# Patient Record
Sex: FEMALE | Race: WHITE | NOT HISPANIC OR LATINO | Employment: UNEMPLOYED | URBAN - METROPOLITAN AREA
[De-identification: names, ages, dates, MRNs, and addresses within clinical notes are randomized per-mention and may not be internally consistent; named-entity substitution may affect disease eponyms.]

---

## 2021-03-30 LAB
HEPATITIS B SURFACE ANTIGEN (EXTERNAL): NONREACTIVE
HIV1+2 AB SERPL QL IA: NONREACTIVE
RUBELLA ANTIBODY IGG (EXTERNAL): NORMAL

## 2021-04-28 ENCOUNTER — TRANSFERRED RECORDS (OUTPATIENT)
Dept: HEALTH INFORMATION MANAGEMENT | Facility: CLINIC | Age: 26
End: 2021-04-28

## 2021-04-28 LAB — PAP-ABSTRACT: NORMAL

## 2021-08-03 ENCOUNTER — TELEPHONE (OUTPATIENT)
Dept: EDUCATION SERVICES | Facility: CLINIC | Age: 26
End: 2021-08-03

## 2021-08-03 ENCOUNTER — TRANSFERRED RECORDS (OUTPATIENT)
Dept: HEALTH INFORMATION MANAGEMENT | Facility: CLINIC | Age: 26
End: 2021-08-03

## 2021-08-03 NOTE — TELEPHONE ENCOUNTER
records received  8/3/2021 11:22am inside DM consult fax     [facility] - [phone], Referring: Dr Yudi Lira  DX: GDM

## 2021-08-04 NOTE — TELEPHONE ENCOUNTER
Date: 8/5/2021 Status: Scheduled   Time: 3:00 PM Length: 60   Visit Type: VIDEO VISIT NEW [1702] Copay: $0.00   Provider: Christi Hernadez RD Department: [facility] DIABETES EDUCATION   Bill Area: Diabetic Education [facility]

## 2021-08-05 ENCOUNTER — PATIENT OUTREACH (OUTPATIENT)
Dept: EDUCATION SERVICES | Facility: CLINIC | Age: 26
End: 2021-08-05
Payer: COMMERCIAL

## 2021-08-05 DIAGNOSIS — O24.419 GESTATIONAL DIABETES: Primary | ICD-10-CM

## 2021-08-05 DIAGNOSIS — O24.410 DIET CONTROLLED GESTATIONAL DIABETES MELLITUS (GDM) IN THIRD TRIMESTER: ICD-10-CM

## 2021-08-05 PROBLEM — Z34.90 PREGNANT: Status: RESOLVED | Noted: 2021-03-30 | Resolved: 2021-08-05

## 2021-08-05 PROBLEM — Z34.90 PREGNANT: Status: ACTIVE | Noted: 2021-03-30

## 2021-08-05 PROCEDURE — G0108 DIAB MANAGE TRN  PER INDIV: HCPCS | Mod: 95 | Performed by: FAMILY MEDICINE

## 2021-08-05 RX ORDER — LANCETS
EACH MISCELLANEOUS
Qty: 100 EACH | Refills: 4 | Status: SHIPPED | OUTPATIENT
Start: 2021-08-05

## 2021-08-05 RX ORDER — BLOOD SUGAR DIAGNOSTIC
STRIP MISCELLANEOUS
Qty: 125 STRIP | Refills: 3 | Status: SHIPPED | OUTPATIENT
Start: 2021-08-05

## 2021-08-05 RX ORDER — BLOOD-GLUCOSE METER
1 EACH MISCELLANEOUS 4 TIMES DAILY
Qty: 1 KIT | Refills: 1 | Status: SHIPPED | OUTPATIENT
Start: 2021-08-05

## 2021-08-05 NOTE — PROGRESS NOTES
"Type of Service: Telephone Visit    How would patient like to obtain AVS? Mail a copy     Diabetes Self-Management Education & Support    ASSESSMENT:  Initial telephone visit for gestational diabetes education and counseling. Yue has counted carbohydrates in the past and caught on quickly. Both of her parents have type 2 diabetes and she has seen them check their blood sugar. Patient is active every day.     SUBJECTIVE/OBJECTIVE:  Accompanied by: Self  Diabetes management related comments/concerns: Newly diagnosed gestational diabetes  Gestational weeks: 27 weeks  [facility]  Next OB Visit Date: 08/12/21  Number of previous preganancies: 0  Had any babies over 9 lbs: No  Previously had Gestational Diabetes: No  Do you drink beer, wine or hard liquor?: No    Cultural Influences/Ethnic Background:  Not  or   Half , half white     Estimated Date of Delivery: 11/4/2021 27w0d    Glucose Screen: 161 (5/24/21)    3 Hour OGTT (8/3/21)  Fasting  100    1 hour  209    2 hour  Not taken    3 hour  Not taken    Lifestyle and Health Behaviors:  Height: 5'1\"   Stated weight: 246 lb  Pre-pregnancy weight (lbs): 241. Was nauseated in the beginning and lost 10 pounds. Slowly regaining some weight as nausea resolved.  Exercise: Yes  Days per week of moderate to strenuous exercise (like a brisk walk): 7  On average, minutes per day of exercise at this level: 60  How intense was your typical exercise? : Light (like stretching or slow walking) (Walks her dog 2-3 times per day for a total of 45-60 minutes/day)  Exercise Minutes per Week: 420  Barrier to exercise: None  Meal planning/habits: Carb counting, Avoiding sweets (Has used [employer] food tracker in the past)  Meals include: Breakfast, Lunch, Dinner, Morning Snack, Afternoon Snack, Evening Snack  Breakfast: Cold cereal or eggs or smoothie, milk. stopped drinking coffee after pregnant.  Lunch: [place] or leftovers  Dinner: Meat " (salmon fish, chicken or pork), green vegetable, starch (pasta or rice or quinoa or potato)  Snacks: Apple or carrots or nuts or chips or granola bar or ice cream  Beverages: Water, Diet soda, Juice (Unsweetened cold tea)  Experiencing nausea?: No  Experiencing heartburn?: No    Healthy Coping:  Emotional response to diabetes: Ready to learn    Current Management:  Taking medications for gestational diabetes? No    INTERVENTION:  Patient was instructed on basic steps for checking blood sugar. After she picks up the meter and supplies at [facility] she will watch a YouTube video on using meter.    Educational topics covered today:  GDM diagnosis, pathophysiology, Risks and Complications of GDM, Means of controlling GDM, Using a Blood Glucose Monitor, Blood Glucose Goals, Logging and Interpreting Glucose Results, Ketone Testing, When to Call a Diabetes Educator or OB Provider, Healthy Eating During Pregnancy, Counting Carbohydrates, Meal Planning for GDM, and Physical Activity    Educational materials sent via email with permission  [place] Understanding Gestational Diabetes  [place] GDM bedtime snack list    Pt verbalized understanding of concepts discussed and recommendations provided today.     PLAN:  Check glucose 4 times daily, before breakfast and 1 hour after each meal.     Check Ketones daily for one week, if negative, reduce testing to once a week.     Physical activity recommended: 10-15 minute walk after meals.    Meal plan: 30-45 gm carbs at breakfast, 45-60 gm carbs at lunch, 45-60 gm carbs at supper, 15-30 gm carbs at 3 snacks a day.  Follow consistent carbohydrate meal plan, eat carbohydrate and protein/fat at all meals/snacks.    Call diabetes care if 3 or more blood sugars are above the goal in 1 week, if ketones are positive, or with questions/concerns.    Diabetes Care: [phone]    Next appointment in person at the [facility] on Thursday, 8/12/21 at 3:30  PM.    Diabetes Self-Management Training ()  Time Spent: 60 minutes  Encounter Type: Individual      Christi Hernadez RD, CARMELLA, [facility]  Certified Diabetes Care and . Cell: [phone]    Any diabetes medication dose changes were made via the CDE Protocol and Collaborative Practice Agreement with the patient's OB/GYN provider. A copy of this encounter was shared with the provider.

## 2021-08-05 NOTE — PATIENT INSTRUCTIONS
1. Check urine for ketones in the morning. Your goal is negative or trace ketones. If you have ketones in your urine it means you are not eating enough before you go to bed. Eat a larger bedtime snack and include protein. Remember that ketones are not good for your baby. Drinking water during the day helps to dilute ketones.    2. Check blood sugar 4 times per day. Before breakfast and 1 hour after every meal.          Blood sugar goals:        Before breakfast: less 95        1 hour after meals: less 140        2 hours after meals: less 120    3. Eat 3 meals and 3 snacks per day according to the meal plan.       Breakfast: 30 grams of carbohydrate       Lunch: and Dinner: 45-60 grams of carbohydrate       Snacks: 15-30 grams of carbohydrate with protein    4. Avoid fruit, juice and cereal at breakfast. These foods tend to cause high blood sugar.    5. Include high-fiber, whole grain foods instead of processed white food. Healthier choices are whole grain bread, whole wheat pasta, brown rice or wild rice.    6. Avoid high sugar, low nutrient foods like sugary drinks, candy, chocolate, syrup, chips and desserts.    7. Staying active after meals helps to decrease blood sugar.    8. Keep a detailed food and blood sugar record and note ketones.     9. Bring to next visit in 1 week(s) on Thursday, 8/12/21 at 3:30 PM.

## 2021-08-05 NOTE — LETTER
"    8/5/2021         RE: Yue Hernandez  [address] [place]  [place] [facility] [address]        Dear Colleague,    Thank you for referring your patient, Yue Hernandez, to the [facility]. Please see a copy of my visit note below.    Type of Service: Telephone Visit    How would patient like to obtain AVS? Mail a copy     Diabetes Self-Management Education & Support    ASSESSMENT:  Initial telephone visit for gestational diabetes education and counseling. Yue has counted carbohydrates in the past and caught on quickly. Both of her parents have type 2 diabetes and she has seen them check their blood sugar. Patient is active every day.     SUBJECTIVE/OBJECTIVE:  Accompanied by: Self  Diabetes management related comments/concerns: Newly diagnosed gestational diabetes  Gestational weeks: 27 weeks  [facility]ospital [facility]  Next OB Visit Date: 08/12/21  Number of previous preganancies: 0  Had any babies over 9 lbs: No  Previously had Gestational Diabetes: No  Do you drink beer, wine or hard liquor?: No    Cultural Influences/Ethnic Background:  Not  or   Half , half white     Estimated Date of Delivery: 11/4/2021 27w0d    Glucose Screen: 161 (5/24/21)    3 Hour OGTT (8/3/21)  Fasting  100    1 hour  209    2 hour  Not taken    3 hour  Not taken    Lifestyle and Health Behaviors:  Height: 5'1\"   Stated weight: 246 lb  Pre-pregnancy weight (lbs): 241. Was nauseated in the beginning and lost 10 pounds. Slowly regaining some weight as nausea resolved.  Exercise: Yes  Days per week of moderate to strenuous exercise (like a brisk walk): 7  On average, minutes per day of exercise at this level: 60  How intense was your typical exercise? : Light (like stretching or slow walking) (Walks her dog 2-3 times per day for a total of 45-60 minutes/day)  Exercise Minutes per Week: 420  Barrier to exercise: None  Meal planning/habits: Carb counting, Avoiding sweets (Has used " [place] [employer] food tracker in the past)  Meals include: Breakfast, Lunch, Dinner, Morning Snack, Afternoon Snack, Evening Snack  Breakfast: Cold cereal or eggs or smoothie, milk. stopped drinking coffee after pregnant.  Lunch: [place] or leftovers  Dinner: Meat (salmon fish, chicken or pork), green vegetable, starch (pasta or rice or quinoa or potato)  Snacks: Apple or carrots or nuts or chips or granola bar or ice cream  Beverages: Water, Diet soda, Juice (Unsweetened cold tea)  Experiencing nausea?: No  Experiencing heartburn?: No    Healthy Coping:  Emotional response to diabetes: Ready to learn    Current Management:  Taking medications for gestational diabetes? No    INTERVENTION:  Patient was instructed on basic steps for checking blood sugar. [facility] she will watch a YouTube video on using meter.    Educational topics covered today:  GDM diagnosis, pathophysiology, Risks and Complications of GDM, Means of controlling GDM, Using a Blood Glucose Monitor, Blood Glucose Goals, Logging and Interpreting Glucose Results, Ketone Testing, When to Call a Diabetes Educator or OB Provider, Healthy Eating During Pregnancy, Counting Carbohydrates, Meal Planning for GDM, and Physical Activity    Educational materials sent via email with permission  [place] Understanding Gestational Diabetes  [place] GDM bedtime snack list    Pt verbalized understanding of concepts discussed and recommendations provided today.     PLAN:  Check glucose 4 times daily, before breakfast and 1 hour after each meal.     Check Ketones daily for one week, if negative, reduce testing to once a week.     Physical activity recommended: 10-15 minute walk after meals.    Meal plan: 30-45 gm carbs at breakfast, 45-60 gm carbs at lunch, 45-60 gm carbs at supper, 15-30 gm carbs at 3 snacks a day.  Follow consistent carbohydrate meal plan, eat carbohydrate and protein/fat at all meals/snacks.    Call diabetes  care if 3 or more blood sugars are above the goal in 1 week, if ketones are positive, or with questions/concerns.    Diabetes Care: [phone]    Next appointment in person at the [facility] on Thursday, 8/12/21 at 3:30 PM.    Diabetes Self-Management Training ()  Time Spent: 60 minutes  Encounter Type: Individual      Christi Hernadez RD, CARMELLA, [facility]  Certified Diabetes Care and . Cell: [phone]    Any diabetes medication dose changes were made via the CDE Protocol and Collaborative Practice Agreement with the patient's OB/GYN provider. A copy of this encounter was shared with the provider.

## 2021-08-12 ENCOUNTER — ALLIED HEALTH/NURSE VISIT (OUTPATIENT)
Dept: EDUCATION SERVICES | Facility: CLINIC | Age: 26
End: 2021-08-12
Payer: COMMERCIAL

## 2021-08-12 VITALS — HEIGHT: 61 IN | WEIGHT: 245 LBS | BODY MASS INDEX: 46.26 KG/M2

## 2021-08-12 DIAGNOSIS — O24.410 DIET CONTROLLED GESTATIONAL DIABETES MELLITUS (GDM) IN THIRD TRIMESTER: Primary | ICD-10-CM

## 2021-08-12 PROCEDURE — G0108 DIAB MANAGE TRN  PER INDIV: HCPCS | Mod: TEL | Performed by: REGISTERED NURSE

## 2021-08-12 ASSESSMENT — MIFFLIN-ST. JEOR: SCORE: 1788.69

## 2021-08-12 NOTE — PROGRESS NOTES
"Diabetes Self-Management Education & Support    ASSESSMENT  One week follow-up visit for gestational diabetes and counseling. Yue is using an renetta to track carbohydrate intake. She had one fasting blood sugar above target. Patient is staying active daily. She feels like she is getting enough food to eat.    Blood Glucose/Ketone Log:   Date Ketones Fasting Post Breakfast Post Lunch Post Supper    neg 103 115      / neg 91 112 110 98   /10 neg 92 101 111 96    neg 87 100 109 94     SUBJECTIVE/OBJECTIVE:  Accompanied by: Self  Diabetes management related comments/concerns: Newly diagnosed gestational diabetes  Gestational weeks: 27w6d  [facility]  Next OB Visit Date:  (30 weeks gestation in about 2 weeks)  Number of previous preganancies: 1  Had any babies over 9 lbs: No  Previously had Gestational Diabetes: No  Do you use tobacco products?: No  Do you drink beer, wine or hard liquor?: No    Cultural Influences/Ethnic Background:  Not  or     Ht 1.549 m (5' 1\")   Wt 111.1 kg (245 lb)   BMI 46.29 kg/m      Weight gain 4 lbs at 28 weeks gestation.    Estimated Date of Delivery: 2021    Lifestyle and Health Behaviors:  Pre-pregnancy weight (lbs): 241  Exercise: Yes  Days per week of moderate to strenuous exercise (like a brisk walk): 7  On average, minutes per day of exercise at this level: 40  How intense was your typical exercise? : Moderate (like brisk walking)  Exercise Minutes per Week: 280  Barrier to exercise: None  Meal planning/habits: Carb counting, Avoiding sweets  Meals include: Breakfast, Lunch, Dinner, Morning Snack, Afternoon Snack, Evening Snack  Breakfast: Eggs, toast, milk  Lunch: sandwich or leftovers  Dinner: [place], chicken, pork, green vegetable, pasta or rice or quinoa or potato  Snacks: carrots or apple, nuts  Beverages: Water, Diet soda, Milk  Biggest challenges to healthy eating: None  Pre- vitamin?: Yes    Healthy " Coping:  Emotional response to diabetes: Ready to learn  Stage of change: ACTION (Actively working towards change)    Current Management:  Taking medications for gestational diabetes?: No  Difficulty affording diabetes testing supplies?: No    INTERVENTION:  Educational topics covered today:  Food choices, activity, blood sugar and ketones goals.     Educational Materials provided today:  No new educational materials provided today    PLAN:  Check glucose 4 times daily.  Check ketones once a week when readings are consistently negative.  Continue with recommended physical activity.  Continue to follow recommended meal plan: 30-45 gm carbs at breakfast, 45-60 gm carbs at lunch, 45-60 gm carbs at supper, 15-30 gm carbs at snacks.  Follow consistent CHO meal plan, eat CHO and protein/fat at all meals/snacks.    Call/e-mail/e-Rewardst message diabetes educator if 3 or more blood sugars are above the goal in 1 week or if ketones are positive.    Diabetes Self-Management Training ()  Time Spent: 30 minutes  Encounter Type: Individual    Any diabetes medication dose changes were made via the CDE Protocol and Collaborative Practice Agreement with the patient's OB/GYN provider. A copy of this encounter was shared with the provider.

## 2021-08-12 NOTE — Clinical Note
"    8/12/2021         RE: Yue Hernandez  [address]  [place] [facility] [address]        Dear Colleague,    Thank you for referring your patient, Yue Hernandez, to the [facility]. Please see a copy of my visit note below.    Diabetes Self-Management Education & Support    ASSESSMENT  One week follow-up visit for gestational diabetes and counseling. Yue is using an renetta to track carbohydrate intake. She had one fasting blood sugar above target. Patient is staying active daily. She feels like she is getting enough food to eat.    Blood Glucose/Ketone Log:   Date Ketones Fasting Post Breakfast Post Lunch Post Supper   8/12 neg 103 115   ***   8/11 neg 91 112 110 98   8/10 neg 92 101 111 96   8/9 neg 87 100 109 94     SUBJECTIVE/OBJECTIVE:  Accompanied by: Self  Diabetes management related comments/concerns: Newly diagnosed gestational diabetes  Gestational weeks: 27w6d  [facility]  Next OB Visit Date:  (30 weeks gestation in about 2 weeks)  Number of previous preganancies: 1  Had any babies over 9 lbs: No  Previously had Gestational Diabetes: No  Do you use tobacco products?: No  Do you drink beer, wine or hard liquor?: No    Cultural Influences/Ethnic Background:  Not  or     Ht 1.549 m (5' 1\")   Wt 111.1 kg (245 lb)   BMI 46.29 kg/m      Weight gain 4 lbs at 28 weeks gestation.    Estimated Date of Delivery: 11/4/2021    Lifestyle and Health Behaviors:  Pre-pregnancy weight (lbs): 241  Exercise:: Yes  Days per week of moderate to strenuous exercise (like a brisk walk): 7  On average, minutes per day of exercise at this level: 40  How intense was your typical exercise? : Moderate (like brisk walking)  Exercise Minutes per Week: 280  Barrier to exercise: None  Meal planning/habits: Carb counting, Avoiding sweets  Meals include: Breakfast, Lunch, Dinner, Morning Snack, Afternoon Snack, Evening Snack  Breakfast: Eggs, toast, milk  Lunch: " sandwich or leftovers  Dinner: [place], chicken, pork, green vegetable, pasta or rice or quinoa or potato  Snacks: carrots or apple, nuts  Beverages: Water, Diet soda, Milk  Biggest challenges to healthy eating: None  Pre- vitamin?: Yes    Healthy Coping:  Emotional response to diabetes: Ready to learn  Stage of change: ACTION (Actively working towards change)    Current Management:  Taking medications for gestational diabetes?: No  Difficulty affording diabetes testing supplies?: No    INTERVENTION:  Educational topics covered today:  Food choices, activity, blood sugar and ketones goals.     Educational Materials provided today:  No new educational materials provided today    PLAN:  Check glucose 4 times daily.  Check ketones once a week when readings are consistently negative.  Continue with recommended physical activity.  Continue to follow recommended meal plan: 30-45 gm carbs at breakfast, 45-60 gm carbs at lunch, 45-60 gm carbs at supper, 15-30 gm carbs at snacks.  Follow consistent CHO meal plan, eat CHO and protein/fat at all meals/snacks.    Call/e-mail/[employer] message diabetes educator if 3 or more blood sugars are above the goal in 1 week or if ketones are positive.    Diabetes Self-Management Training ()  Time Spent: 30 minutes  Encounter Type: Individual    Any diabetes medication dose changes were made via the CDE Protocol and Collaborative Practice Agreement with the patient's OB/GYN provider. A copy of this encounter was shared with the provider.

## 2021-08-12 NOTE — LETTER
"    8/12/2021         RE: Yue Hernandez  [address]  [facility] [address]        Dear Colleague,    Thank you for referring your patient, Yue Hernandez, to the [facility]. Please see a copy of my visit note below.    Diabetes Self-Management Education & Support    ASSESSMENT  One week follow-up visit for gestational diabetes and counseling. Yue is using an renetta to track carbohydrate intake. She had one fasting blood sugar above target. Patient is staying active daily. She feels like she is getting enough food to eat.    Blood Glucose/Ketone Log:   Date Ketones Fasting Post Breakfast Post Lunch Post Supper   8/12 neg 103 115   ***   8/11 neg 91 112 110 98   8/10 neg 92 101 111 96   8/9 neg 87 100 109 94     SUBJECTIVE/OBJECTIVE:  Accompanied by: Self  Diabetes management related comments/concerns: Newly diagnosed gestational diabetes  Gestational weeks: 27w6d  [facility]  Next OB Visit Date:  (30 weeks gestation in about 2 weeks)  Number of previous preganancies: 1  Had any babies over 9 lbs: No  Previously had Gestational Diabetes: No  Do you use tobacco products?: No  Do you drink beer, wine or hard liquor?: No    Cultural Influences/Ethnic Background:  Not  or     Ht 1.549 m (5' 1\")   Wt 111.1 kg (245 lb)   BMI 46.29 kg/m      Weight gain 4 lbs at 28 weeks gestation.    Estimated Date of Delivery: 11/4/2021    Lifestyle and Health Behaviors:  Pre-pregnancy weight (lbs): 241  Exercise:: Yes  Days per week of moderate to strenuous exercise (like a brisk walk): 7  On average, minutes per day of exercise at this level: 40  How intense was your typical exercise? : Moderate (like brisk walking)  Exercise Minutes per Week: 280  Barrier to exercise: None  Meal planning/habits: Carb counting, Avoiding sweets  Meals include: Breakfast, Lunch, Dinner, Morning Snack, Afternoon Snack, Evening Snack  Breakfast: Eggs, toast, milk  Lunch: " sandwich or leftovers  Dinner: [place], chicken, pork, green vegetable, pasta or rice or quinoa or potato  Snacks: carrots or apple, nuts  Beverages: Water, Diet soda, Milk  Biggest challenges to healthy eating: None  Pre- vitamin?: Yes    Healthy Coping:  Emotional response to diabetes: Ready to learn  Stage of change: ACTION (Actively working towards change)    Current Management:  Taking medications for gestational diabetes?: No  Difficulty affording diabetes testing supplies?: No    INTERVENTION:  Educational topics covered today:  Food choices, activity, blood sugar and ketones goals.     Educational Materials provided today:  No new educational materials provided today    PLAN:  Check glucose 4 times daily.  Check ketones once a week when readings are consistently negative.  Continue with recommended physical activity.  Continue to follow recommended meal plan: 30-45 gm carbs at breakfast, 45-60 gm carbs at lunch, 45-60 gm carbs at supper, 15-30 gm carbs at snacks.  Follow consistent CHO meal plan, eat CHO and protein/fat at all meals/snacks.    Call/e-mail/[employer] message diabetes educator if 3 or more blood sugars are above the goal in 1 week or if ketones are positive.    Diabetes Self-Management Training ()  Time Spent: 30 minutes  Encounter Type: Individual    Any diabetes medication dose changes were made via the CDE Protocol and Collaborative Practice Agreement with the patient's OB/GYN provider. A copy of this encounter was shared with the provider.            Diabetes Self-Management Education & Support    ASSESSMENT  One week follow-up visit for gestational diabetes and counseling. Yue is using an renetta to track carbohydrate intake. She had one fasting blood sugar above target. Patient is staying active daily. She feels like she is getting enough food to eat.    Blood Glucose/Ketone Log:   Date Ketones Fasting Post Breakfast Post Lunch Post Supper    neg 103 115       neg 91 112 110  "98   8/10 neg 92 101 111 96    neg 87 100 109 94     SUBJECTIVE/OBJECTIVE:  Accompanied by: Self  Diabetes management related comments/concerns: Newly diagnosed gestational diabetes  Gestational weeks: 27w6d  [facility]  Next OB Visit Date:  (30 weeks gestation in about 2 weeks)  Number of previous preganancies: 1  Had any babies over 9 lbs: No  Previously had Gestational Diabetes: No  Do you use tobacco products?: No  Do you drink beer, wine or hard liquor?: No    Cultural Influences/Ethnic Background:  Not  or     Ht 1.549 m (5' 1\")   Wt 111.1 kg (245 lb)   BMI 46.29 kg/m      Weight gain 4 lbs at 28 weeks gestation.    Estimated Date of Delivery: 2021    Lifestyle and Health Behaviors:  Pre-pregnancy weight (lbs): 241  Exercise: Yes  Days per week of moderate to strenuous exercise (like a brisk walk): 7  On average, minutes per day of exercise at this level: 40  How intense was your typical exercise? : Moderate (like brisk walking)  Exercise Minutes per Week: 280  Barrier to exercise: None  Meal planning/habits: Carb counting, Avoiding sweets  Meals include: Breakfast, Lunch, Dinner, Morning Snack, Afternoon Snack, Evening Snack  Breakfast: Eggs, toast, milk  Lunch: sandwich or leftovers  Dinner: [place], chicken, pork, green vegetable, pasta or rice or quinoa or potato  Snacks: carrots or apple, nuts  Beverages: Water, Diet soda, Milk  Biggest challenges to healthy eating: None  Pre-gregory vitamin?: Yes    Healthy Coping:  Emotional response to diabetes: Ready to learn  Stage of change: ACTION (Actively working towards change)    Current Management:  Taking medications for gestational diabetes?: No  Difficulty affording diabetes testing supplies?: No    INTERVENTION:  Educational topics covered today:  Food choices, activity, blood sugar and ketones goals.     Educational Materials provided today:  No new educational materials provided today    PLAN:  Check " glucose 4 times daily.  Check ketones once a week when readings are consistently negative.  Continue with recommended physical activity.  Continue to follow recommended meal plan: 30-45 gm carbs at breakfast, 45-60 gm carbs at lunch, 45-60 gm carbs at supper, 15-30 gm carbs at snacks.  Follow consistent CHO meal plan, eat CHO and protein/fat at all meals/snacks.    Call/e-mail/MyChart message diabetes educator if 3 or more blood sugars are above the goal in 1 week or if ketones are positive.    Diabetes Self-Management Training ()  Time Spent: 30 minutes  Encounter Type: Individual    Any diabetes medication dose changes were made via the CDE Protocol and Collaborative Practice Agreement with the patient's OB/GYN provider. A copy of this encounter was shared with the provider.

## 2021-08-12 NOTE — LETTER
"    8/12/2021         RE: Yue Hernandez  [address]  [facility] [address]        Dear Colleague,    Thank you for referring your patient, Yue Hernandez, to the [facility]. Please see a copy of my visit note below.    Diabetes Self-Management Education & Support    ASSESSMENT  One week follow-up visit for gestational diabetes and counseling. Yue is using an renetta to track carbohydrate intake. She had one fasting blood sugar above target. Patient is staying active daily. She feels like she is getting enough food to eat.    Blood Glucose/Ketone Log:   Date Ketones Fasting Post Breakfast Post Lunch Post Supper   8/12 neg 103 115      8/11 neg 91 112 110 98   8/10 neg 92 101 111 96   8/9 neg 87 100 109 94     SUBJECTIVE/OBJECTIVE:  Accompanied by: Self  Diabetes management related comments/concerns: Newly diagnosed gestational diabetes  Gestational weeks: 27w6d  [facility]  Next OB Visit Date:  (30 weeks gestation in about 2 weeks)  Number of previous preganancies: 1  Had any babies over 9 lbs: No  Previously had Gestational Diabetes: No  Do you use tobacco products?: No  Do you drink beer, wine or hard liquor?: No    Cultural Influences/Ethnic Background:  Not  or     Ht 1.549 m (5' 1\")   Wt 111.1 kg (245 lb)   BMI 46.29 kg/m      Weight gain 4 lbs at 28 weeks gestation.    Estimated Date of Delivery: 11/4/2021    Lifestyle and Health Behaviors:  Pre-pregnancy weight (lbs): 241  Exercise: Yes  Days per week of moderate to strenuous exercise (like a brisk walk): 7  On average, minutes per day of exercise at this level: 40  How intense was your typical exercise? : Moderate (like brisk walking)  Exercise Minutes per Week: 280  Barrier to exercise: None  Meal planning/habits: Carb counting, Avoiding sweets  Meals include: Breakfast, Lunch, Dinner, Morning Snack, Afternoon Snack, Evening Snack  Breakfast: Eggs, toast, milk  Lunch: sandwich or " leftovers  Dinner: [place], chicken, pork, green vegetable, pasta or rice or quinoa or potato  Snacks: carrots or apple, nuts  Beverages: Water, Diet soda, Milk  Biggest challenges to healthy eating: None  Pre- vitamin?: Yes    Healthy Coping:  Emotional response to diabetes: Ready to learn  Stage of change: ACTION (Actively working towards change)    Current Management:  Taking medications for gestational diabetes?: No  Difficulty affording diabetes testing supplies?: No    INTERVENTION:  Educational topics covered today:  Food choices, activity, blood sugar and ketones goals.     Educational Materials provided today:  No new educational materials provided today    PLAN:  Check glucose 4 times daily.  Check ketones once a week when readings are consistently negative.  Continue with recommended physical activity.  Continue to follow recommended meal plan: 30-45 gm carbs at breakfast, 45-60 gm carbs at lunch, 45-60 gm carbs at supper, 15-30 gm carbs at snacks.  Follow consistent CHO meal plan, eat CHO and protein/fat at all meals/snacks.    Call/e-mail/[employer] message diabetes educator if 3 or more blood sugars are above the goal in 1 week or if ketones are positive.    Diabetes Self-Management Training ()  Time Spent: 30 minutes  Encounter Type: Individual    Any diabetes medication dose changes were made via the CDE Protocol and Collaborative Practice Agreement with the patient's OB/GYN provider. A copy of this encounter was shared with the provider.

## 2021-08-12 NOTE — LETTER
"    8/12/2021         RE: Yue Hernandez  [address]  [facility] [address]        Dear Colleague,    Thank you for referring your patient, Yue Hernandez, to the [facility]. Please see a copy of my visit note below.    Diabetes Self-Management Education & Support    ASSESSMENT  One week follow-up visit for gestational diabetes and counseling. Yue is using an renetta to track carbohydrate intake. She had one fasting blood sugar above target. Patient is staying active daily. She feels like she is getting enough food to eat.    Blood Glucose/Ketone Log:   Date Ketones Fasting Post Breakfast Post Lunch Post Supper   8/12 neg 103 115      8/11 neg 91 112 110 98   8/10 neg 92 101 111 96   8/9 neg 87 100 109 94     SUBJECTIVE/OBJECTIVE:  Accompanied by: Self  Diabetes management related comments/concerns: Newly diagnosed gestational diabetes  Gestational weeks: 27w6d  [facility]  Next OB Visit Date:  (30 weeks gestation in about 2 weeks)  Number of previous preganancies: 1  Had any babies over 9 lbs: No  Previously had Gestational Diabetes: No  Do you use tobacco products?: No  Do you drink beer, wine or hard liquor?: No    Cultural Influences/Ethnic Background:  Not  or     Ht 1.549 m (5' 1\")   Wt 111.1 kg (245 lb)   BMI 46.29 kg/m      Weight gain 4 lbs at 28 weeks gestation.    Estimated Date of Delivery: 11/4/2021    Lifestyle and Health Behaviors:  Pre-pregnancy weight (lbs): 241  Exercise: Yes  Days per week of moderate to strenuous exercise (like a brisk walk): 7  On average, minutes per day of exercise at this level: 40  How intense was your typical exercise? : Moderate (like brisk walking)  Exercise Minutes per Week: 280  Barrier to exercise: None  Meal planning/habits: Carb counting, Avoiding sweets  Meals include: Breakfast, Lunch, Dinner, Morning Snack, Afternoon Snack, Evening Snack  Breakfast: Eggs, toast, milk  Lunch: sandwich or " leftovers  Dinner: [place], chicken, pork, green vegetable, pasta or rice or quinoa or potato  Snacks: carrots or apple, nuts  Beverages: Water, Diet soda, Milk  Biggest challenges to healthy eating: None  Pre- vitamin?: Yes    Healthy Coping:  Emotional response to diabetes: Ready to learn  Stage of change: ACTION (Actively working towards change)    Current Management:  Taking medications for gestational diabetes?: No  Difficulty affording diabetes testing supplies?: No    INTERVENTION:  Educational topics covered today:  Food choices, activity, blood sugar and ketones goals.     Educational Materials provided today:  No new educational materials provided today    PLAN:  Check glucose 4 times daily.  Check ketones once a week when readings are consistently negative.  Continue with recommended physical activity.  Continue to follow recommended meal plan: 30-45 gm carbs at breakfast, 45-60 gm carbs at lunch, 45-60 gm carbs at supper, 15-30 gm carbs at snacks.  Follow consistent CHO meal plan, eat CHO and protein/fat at all meals/snacks.    Call/e-mail/[employer] message diabetes educator if 3 or more blood sugars are above the goal in 1 week or if ketones are positive.    Diabetes Self-Management Training ()  Time Spent: 30 minutes  Encounter Type: Individual    Any diabetes medication dose changes were made via the CDE Protocol and Collaborative Practice Agreement with the patient's OB/GYN provider. A copy of this encounter was shared with the provider.

## 2021-08-13 NOTE — PATIENT INSTRUCTIONS
1. Continue to check urine for ketones in the morning. Your goal is negative or trace ketones.     2. Continue to check blood sugar 4 times per day. Before breakfast and 1 hour after meals.        Blood sugar goals:       Before breakfast: less 95      1 hour after meals: less 140      2 hours after meals: less 120    3. Continue to eat 3 meals and 3 snacks per day according to the meal plan.   Breakfast: 30-45 grams of carbohydrate with protein   Lunch and dinner: 45-60 grams of carbohydrate  Snacks: 15-30 grams of carbohydrate with protein    4. Continue to stay active after meals to manage blood sugars.    5. Keep a detailed food and blood sugar record and note ketone levels.     6. Next follow-up in 3 weeks.    7. Call [facility] or send a [employer] message if you have 3 blood sugars above target in one week or positive ketones.    Diabetes [facility]: [phone]

## 2021-09-02 ENCOUNTER — ALLIED HEALTH/NURSE VISIT (OUTPATIENT)
Dept: EDUCATION SERVICES | Facility: CLINIC | Age: 26
End: 2021-09-02
Payer: COMMERCIAL

## 2021-09-02 VITALS — BODY MASS INDEX: 46.35 KG/M2 | WEIGHT: 245.3 LBS

## 2021-09-02 DIAGNOSIS — O24.410 DIET CONTROLLED GESTATIONAL DIABETES MELLITUS (GDM) IN THIRD TRIMESTER: Primary | ICD-10-CM

## 2021-09-02 PROCEDURE — G0108 DIAB MANAGE TRN  PER INDIV: HCPCS | Mod: AE | Performed by: FAMILY MEDICINE

## 2021-09-02 NOTE — LETTER
9/2/2021         RE: Yue Hernandez  [address]  [facility] [address]        Dear Colleague,    Thank you for referring your patient, Yue Hernandez, to the [facility]. Please see a copy of my visit note below.    Gestational Diabetes Follow-up Visit    ASSESSMENT:  Three week follow-up visit for gestational diabetes and counseling. Yue is checking her blood sugar 4 times per day. She had 3 post-meal blood sugar elevations due to food choices ([employer] day, cake at a birthday party and a snack close to time of testing). Most ketones are negative. She continues to eat 3 meals and 3 snacks per day. Yue feels she is getting enough food. Patient is staying active after meals.    SUBJECTIVE/OBJECTIVE:  She is accompanied by self    Patient's gestational diabetes management related comments/concerns: none    Wt 111.3 kg (245 lb 4.8 oz)   BMI 46.35 kg/m      Weight gain 4 lbs at 31 weeks gestation.  Pre-pregnancy weight: 241 lb    Blood Glucose/Ketone Log: Uses an Accu-Chek Guide Me meter  Waiting for Yue to send a copy of blood sugar log. Called her Friday, 9/3/21 and left a message.    Current gestational diabetes management    Taking medications for gestational diabetes? No    Physical Activity: moderate regular exercise program which includes walking the dog and on a treadmill for 30-45 minutes per day and swimming    Nutrition:  Patient eats 3 meals and 3 snacks per day and is following recommended meal plan.    Health Beliefs and Attitudes:   Stage of Change: ACTION (Actively working towards change)    INTERVENTION:  Educational topics covered today:   Blood sugar patterns, food and activity habits.    Educational Materials provided today:  No new material provided    PLAN:  Check glucose 4 times daily.  Check ketones once per day first thing in the morning.  Continue with recommended physical activity.  Continue to follow recommended meal plan: 30-45 gm carbs at  breakfast, 45-60 gm carbs at lunch, 45-60 gm carbs at supper, 15-30 gm carbs at snacks.  Follow consistent CHO meal plan, eat CHO and protein/fat at all meals/snacks.    Call Diabetes Care or send me a message diabetes educator if 3 or more blood sugars are above the goal in 1 week or if ketones are positive.     FOLLOW-UP:  Telephone visit in one month on 10/5/21 at 9:30 AM    Diabetes Self-Management Training ()  Time spent was 30 minutes  Encounter type: Individual    Any diabetes medication dose changes were made via the CDE Protocol and Collaborative Practice Agreement with the patient's OB/GYN provider. A copy of this encounter was shared with the provider.

## 2021-09-02 NOTE — PATIENT INSTRUCTIONS
1. Continue to check urine for ketones in the morning. Your goal is negative or trace ketones. If you have ketones in your urine it means you are not eating enough before you go to bed. Eat a larger bedtime snack and include protein. Remember that ketones are not good for your baby. Drinking water during the day helps to dilute ketones.    2. Continue to check blood sugar 4 times per day. Before breakfast and 1 hour after meals.        Blood sugar goals:       Before breakfast: less 95      1 hour after meals: less 140      2 hours after meals: less 120    3. Continue to eat 3 meals and 3 snacks per day according to the meal plan.   Breakfast: 30-45 grams of carbohydrate with protein   Lunch and dinner: 45-60 grams of carbohydrate  Snacks: 15-30 grams of carbohydrate with protein    4. Continue to stay active after meals to manage blood sugars.    5. Keep a detailed food and blood sugar record and note ketone levels.     6. Next follow-up in 4 weeks.    7. Call [facility] or send a [employer] message if you have 3 blood sugars above target in one week.    [facility]abetes [facility]: [phone]

## 2021-09-02 NOTE — PROGRESS NOTES
Gestational Diabetes Follow-up Visit    ASSESSMENT:  Three week follow-up visit for gestational diabetes and counseling. Yue is checking her blood sugar 4 times per day. She had 3 post-meal blood sugar elevations due to food choices ([employer] day, cake at a birthday party and a snack close to time of testing). Most ketones are negative. She continues to eat 3 meals and 3 snacks per day. Yue feels she is getting enough food. Patient is staying active after meals.    SUBJECTIVE/OBJECTIVE:  She is accompanied by self    Patient's gestational diabetes management related comments/concerns: none    Wt 111.3 kg (245 lb 4.8 oz)   BMI 46.35 kg/m      Weight gain 4 lbs at 31 weeks gestation.  Pre-pregnancy weight: 241 lb    Blood Glucose/Ketone Log: Uses an Accu-Chek Guide Me meter          Current gestational diabetes management    Taking medications for gestational diabetes? No    Physical Activity: moderate regular exercise program which includes walking the dog and on a treadmill for 30-45 minutes per day and swimming    Nutrition:  Patient eats 3 meals and 3 snacks per day and is following recommended meal plan.    Health Beliefs and Attitudes:   Stage of Change: ACTION (Actively working towards change)    INTERVENTION:  Educational topics covered today:   Blood sugar patterns, food and activity habits.    Educational Materials provided today:  No new material provided    PLAN:  Check glucose 4 times daily.  Check ketones once per day first thing in the morning.  Continue with recommended physical activity.  Continue to follow recommended meal plan: 30-45 gm carbs at breakfast, 45-60 gm carbs at lunch, 45-60 gm carbs at supper, 15-30 gm carbs at snacks.  Follow consistent CHO meal plan, eat CHO and protein/fat at all meals/snacks.    Call Diabetes Care or send me a message diabetes educator if 3 or more blood sugars are above the goal in 1 week or if ketones are positive.     FOLLOW-UP:  Telephone visit in one month  on 10/5/21 at 9:30 AM    Diabetes Self-Management Training ()  Time spent was 30 minutes  Encounter type: Individual    Any diabetes medication dose changes were made via the CDE Protocol and Collaborative Practice Agreement with the patient's OB/GYN provider. A copy of this encounter was shared with the provider.

## 2021-10-05 ENCOUNTER — VIRTUAL VISIT (OUTPATIENT)
Dept: EDUCATION SERVICES | Facility: CLINIC | Age: 26
End: 2021-10-05
Payer: COMMERCIAL

## 2021-10-05 DIAGNOSIS — O24.410 DIET CONTROLLED GESTATIONAL DIABETES MELLITUS (GDM) IN THIRD TRIMESTER: Primary | ICD-10-CM

## 2021-10-05 PROCEDURE — 98967 PH1 ASSMT&MGMT NQHP 11-20: CPT | Mod: TEL | Performed by: REGISTERED NURSE

## 2021-10-05 NOTE — LETTER
10/5/2021         RE: Yue Hernandez  [address] [place] [address] Dr MORALES  [facility] [address]        Dear Colleague,    Thank you for referring your patient, Yue Hernandez, to the [facility]. Please see a copy of my visit note below.    Diabetes Self-Management Education & Support  Type of visit: Telephone return  AVS via mail    ASSESSMENT:  One month follow-up visit for gestational diabetes education and counseling. Yue is checking her blood sugar 4 times per day. Occasionally her post meal blood sugars are above target due to food choices. All urine ketones negative or trace. Per protocol, decreased ketone testing to once per week. Yue is staying active every day. Completed education topics regarding diabetes care during and after delivery.     Blood Glucose/Ketone Log:        PLAN:  1. Check urine for ketones once per week in the morning.  2. Continue to check blood sugar 4 times per day as previous.  3. Continue to stay active every day.  4. Call Diabetes Care if you have 3 elevated blood sugar levels in one week. DIABETES CARE: [phone]    SUBJECTIVE/OBJECTIVE:  Presents for education related to gestational diabetes.  Accompanied by: Self  Diabetes management related comments/concerns: Gestatational diabetes management  Gestational weeks: 35w5d  [facility]  Number of previous preganancies: 0  Had any babies over 9 lbs: No  Previously had Gestational Diabetes: No  Do you use tobacco products?: No  Do you drink beer, wine or hard liquor?: No    Cultural Influences/Ethnic Background:  Not  or     There were no vitals taken for this visit.      Estimated Date of Delivery: Nov 4, 2021    Lifestyle and Health Behaviors:  Pre-pregnancy weight (lbs): 241  Exercise:: Yes  Days per week of moderate to strenuous exercise (like a brisk walk): 7  On average, minutes per day of exercise at this level: 30 (swimming at the gym, walking on a treadmill and  walking the dog)  How intense was your typical exercise? : Light (like stretching or slow walking)  Exercise Minutes per Week: 210  Barrier to exercise: None  Meal planning/habits: Carb counting, Avoiding sweets  Meals include: Breakfast, Lunch, Dinner, Morning Snack, Afternoon Snack, Evening Snack  Breakfast: Eggs, whole wheat english muffin, milk or peanut butter toast with almond milk  Lunch: leftovers (zuchinni lasanga) or low carb torilla wrap  Dinner: [place], chicken, pork, green vegetable, pasta or brown rice or quinoa or potato  Snacks: carrots or apple, nuts  Other: Does not eat a lot of fruit. Likes vegetables better  Beverages: Water, Diet soda, Milk  Biggest challenges to healthy eating: None  Pre- vitamin?: Yes  Experiencing nausea?: No  Experiencing heartburn?: No    Healthy Coping:  Emotional response to diabetes: Ready to learn, Acceptance  Informal Support system:: Spouse  Stage of change: ACTION (Actively working towards change)    Current Management:  Taking medications for gestational diabetes?: No  Difficulty affording diabetes testing supplies?: No    INTERVENTION:  Educational topics covered today:  What to expect after delivery, Future testing for Type 2 diabetes (2 hour OGTT at 6 week post-partum check-up and annual fasting blood glucose level), Risk of GDM and planning ahead for future pregnancies, Recommended lifestyle interventions for reducing the risk of Type 2 Diabetes, When to Call a Diabetes Educator or OB Provider    Educational Materials provided today:  No new educational materials provided     Diabetes Self-Management Training:  Time Spent: 17 minutes  Encounter Type: Individual    Christi Hernadez RD, CARMELLA, [facility]  Certified Diabetes Care and     Any diabetes medication dose changes were made via the CDE Protocol and Collaborative Practice Agreement with the patient's OB/GYN provider. A copy of this encounter was shared with the provider.

## 2021-10-05 NOTE — PROGRESS NOTES
Diabetes Self-Management Education & Support  Type of visit: Telephone return  AVS via mail    ASSESSMENT:  One month follow-up visit for gestational diabetes education and counseling. Yue is checking her blood sugar 4 times per day. Occasionally her post meal blood sugars are above target due to food choices. All urine ketones negative or trace. Per protocol, decreased ketone testing to once per week. Yue is staying active every day. Completed education topics regarding diabetes care during and after delivery.     Blood Glucose/Ketone Log:        PLAN:  1. Check urine for ketones once per week in the morning.  2. Continue to check blood sugar 4 times per day as previous.  3. Continue to stay active every day.  4. Call Diabetes Care if you have 3 elevated blood sugar levels in one week. DIABETES CARE: [phone]    SUBJECTIVE/OBJECTIVE:  Presents for education related to gestational diabetes.  Accompanied by: Self  Diabetes management related comments/concerns: Gestatational diabetes management  Gestational weeks: 35w5d  [facility]  Number of previous preganancies: 0  Had any babies over 9 lbs: No  Previously had Gestational Diabetes: No  Do you use tobacco products?: No  Do you drink beer, wine or hard liquor?: No    Cultural Influences/Ethnic Background:  Not  or     There were no vitals taken for this visit.      Estimated Date of Delivery: Nov 4, 2021    Lifestyle and Health Behaviors:  Pre-pregnancy weight (lbs): 241  Exercise:: Yes  Days per week of moderate to strenuous exercise (like a brisk walk): 7  On average, minutes per day of exercise at this level: 30 (swimming at the gym, walking on a treadmill and walking the dog)  How intense was your typical exercise? : Light (like stretching or slow walking)  Exercise Minutes per Week: 210  Barrier to exercise: None  Meal planning/habits: Carb counting, Avoiding sweets  Meals include: Breakfast, Lunch, Dinner,  Morning Snack, Afternoon Snack, Evening Snack  Breakfast: Eggs, whole wheat english muffin, milk or peanut butter toast with almond milk  Lunch: leftovers (zuchinni lasanga) or low carb torilla wrap  Dinner: [place], chicken, pork, green vegetable, pasta or brown rice or quinoa or potato  Snacks: carrots or apple, nuts  Other: Does not eat a lot of fruit. Likes vegetables better  Beverages: Water, Diet soda, Milk  Biggest challenges to healthy eating: None  Pre-gregory vitamin?: Yes  Experiencing nausea?: No  Experiencing heartburn?: No    Healthy Coping:  Emotional response to diabetes: Ready to learn, Acceptance  Informal Support system:: Spouse  Stage of change: ACTION (Actively working towards change)    Current Management:  Taking medications for gestational diabetes?: No  Difficulty affording diabetes testing supplies?: No    INTERVENTION:  Educational topics covered today:  What to expect after delivery, Future testing for Type 2 diabetes (2 hour OGTT at 6 week post-partum check-up and annual fasting blood glucose level), Risk of GDM and planning ahead for future pregnancies, Recommended lifestyle interventions for reducing the risk of Type 2 Diabetes, When to Call a Diabetes Educator or OB Provider    Educational Materials provided today:  No new educational materials provided     Diabetes Self-Management Training:  Time Spent: 17 minutes  Encounter Type: Individual    Christi Hernadez RD, CARMELLA, [facility]  Certified Diabetes Care and     Any diabetes medication dose changes were made via the CDE Protocol and Collaborative Practice Agreement with the patient's OB/GYN provider. A copy of this encounter was shared with the provider.

## 2021-10-05 NOTE — PATIENT INSTRUCTIONS
Today is your last visit for gestational diabetes education and counseling. Continue to check blood sugars 4 times per day until you deliver your baby. Decrease ketone testing to once per week in the morning. Continue eating 3 meals and 3 snacks per day according to the food plan. Stay active every day.     Call Diabetes Care or send a [employer] message if you have 3 blood sugars above target in one week.    Diabetes Care: [phone]    Once you have the baby, it is suggested to check your blood sugar occasionally (1-2 times per week) for 6 weeks.    When you are not pregnant, normal blood sugars are:  Before breakfast: under 100  2 hours after meals: under 140    The American Diabetes Association recommends:  1. A glucose tolerance test 6 weeks after you deliver your baby.  2. A hemoglobin A1C test yearly after delivery. The A1C test is a 2-3 month estimated average blood sugar reading and is a lab test.  3. Discuss getting these tests with your provider.    After delivery when your provider tells you it is safe to be active work toward getting 150 minutes each week of physical activity. Maintaining a healthy body weight and physical activity decrease your risk of getting Type 2 diabetes.     Keep up the excellent work! Feel free to contact me with any questions.

## 2021-10-05 NOTE — LETTER
10/5/2021         RE: Yue Hernandez  [address] [place] [address] Dr MORALES  [facility] [address]        Dear Colleague,    Thank you for referring your patient, Yue Hernandez, to the [facility]. Please see a copy of my visit note below.    Diabetes Self-Management Education & Support  Type of visit: Telephone return  AVS via mail    ASSESSMENT:  One month follow-up visit for gestational diabetes education and counseling. Yue is checking her blood sugar 4 times per day. Occasionally her post meal blood sugars are above target due to food choices. All urine ketones negative or trace. Per protocol, decreased ketone testing to once per week. Yue is staying active every day. Completed education topics regarding diabetes care during and after delivery.     Blood Glucose/Ketone Log:        PLAN:  1. Check urine for ketones once per week in the morning.  2. Continue to check blood sugar 4 times per day as previous.  3. Continue to stay active every day.  4. Call Diabetes Care if you have 3 elevated blood sugar levels in one week. DIABETES CARE: [phone]    SUBJECTIVE/OBJECTIVE:  Presents for education related to gestational diabetes.  Accompanied by: Self  Diabetes management related comments/concerns: Gestatational diabetes management  Gestational weeks: 35w5d  [facility]  Number of previous preganancies: 0  Had any babies over 9 lbs: No  Previously had Gestational Diabetes: No  Do you use tobacco products?: No  Do you drink beer, wine or hard liquor?: No    Cultural Influences/Ethnic Background:  Not  or     There were no vitals taken for this visit.      Estimated Date of Delivery: Nov 4, 2021    Lifestyle and Health Behaviors:  Pre-pregnancy weight (lbs): 241  Exercise:: Yes  Days per week of moderate to strenuous exercise (like a brisk walk): 7  On average, minutes per day of exercise at this level: 30 (swimming at the gym, walking on a treadmill and  walking the dog)  How intense was your typical exercise? : Light (like stretching or slow walking)  Exercise Minutes per Week: 210  Barrier to exercise: None  Meal planning/habits: Carb counting, Avoiding sweets  Meals include: Breakfast, Lunch, Dinner, Morning Snack, Afternoon Snack, Evening Snack  Breakfast: Eggs, whole wheat english muffin, milk or peanut butter toast with almond milk  Lunch: leftovers (zuchinni lasanga) or low carb torilla wrap  Dinner: [place], chicken, pork, green vegetable, pasta or brown rice or quinoa or potato  Snacks: carrots or apple, nuts  Other: Does not eat a lot of fruit. Likes vegetables better  Beverages: Water, Diet soda, Milk  Biggest challenges to healthy eating: None  Pre- vitamin?: Yes  Experiencing nausea?: No  Experiencing heartburn?: No    Healthy Coping:  Emotional response to diabetes: Ready to learn, Acceptance  Informal Support system:: Spouse  Stage of change: ACTION (Actively working towards change)    Current Management:  Taking medications for gestational diabetes?: No  Difficulty affording diabetes testing supplies?: No    INTERVENTION:  Educational topics covered today:  What to expect after delivery, Future testing for Type 2 diabetes (2 hour OGTT at 6 week post-partum check-up and annual fasting blood glucose level), Risk of GDM and planning ahead for future pregnancies, Recommended lifestyle interventions for reducing the risk of Type 2 Diabetes, When to Call a Diabetes Educator or OB Provider    Educational Materials provided today:  No new educational materials provided     Diabetes Self-Management Training:  Time Spent: 17 minutes  Encounter Type: Individual    Christi Hernadez RD, LD, [facility]  Certified Diabetes Care and     Any diabetes medication dose changes were made via the CDE Protocol and Collaborative Practice Agreement with the patient's {diabetes education provider list:[id number]}. A copy of this encounter was shared with the  provider.

## 2021-10-13 LAB — GROUP B STREPTOCOCCUS (EXTERNAL): NEGATIVE

## 2021-10-14 ENCOUNTER — TRANSFERRED RECORDS (OUTPATIENT)
Dept: HEALTH INFORMATION MANAGEMENT | Facility: CLINIC | Age: 26
End: 2021-10-14
Payer: COMMERCIAL

## 2021-10-14 LAB — HBA1C MFR BLD: 6.1 %

## 2021-10-22 ENCOUNTER — HOSPITAL ENCOUNTER (INPATIENT)
Facility: CLINIC | Age: 26
LOS: 2 days | Discharge: HOME | End: 2021-10-24
Attending: OBSTETRICS & GYNECOLOGY | Admitting: OBSTETRICS & GYNECOLOGY
Payer: COMMERCIAL

## 2021-10-22 DIAGNOSIS — O42.90 DELAYED DELIVERY AFTER SROM (SPONTANEOUS RUPTURE OF MEMBRANES): Primary | ICD-10-CM

## 2021-10-22 PROBLEM — Z36.89 ENCOUNTER FOR TRIAGE IN PREGNANT PATIENT: Status: ACTIVE | Noted: 2021-10-22

## 2021-10-22 LAB
ABO/RH(D): NORMAL
ANTIBODY SCREEN: NEGATIVE
GLUCOSE BLDC GLUCOMTR-MCNC: 96 MG/DL (ref 70–99)
HOLD SPECIMEN: NORMAL
RUPTURE OF FETAL MEMBRANES BY ROM PLUS: POSITIVE
SARS-COV-2 RNA RESP QL NAA+PROBE: NEGATIVE
SPECIMEN EXPIRATION DATE: NORMAL
T PALLIDUM AB SER QL: NEGATIVE

## 2021-10-22 PROCEDURE — 86780 TREPONEMA PALLIDUM: CPT | Performed by: OBSTETRICS & GYNECOLOGY

## 2021-10-22 PROCEDURE — 120N000001 HC R&B MED SURG/OB

## 2021-10-22 PROCEDURE — 86900 BLOOD TYPING SEROLOGIC ABO: CPT | Performed by: OBSTETRICS & GYNECOLOGY

## 2021-10-22 PROCEDURE — 250N000009 HC RX 250: Performed by: OBSTETRICS & GYNECOLOGY

## 2021-10-22 PROCEDURE — 0KQM0ZZ REPAIR PERINEUM MUSCLE, OPEN APPROACH: ICD-10-PCS | Performed by: OBSTETRICS & GYNECOLOGY

## 2021-10-22 PROCEDURE — 250N000013 HC RX MED GY IP 250 OP 250 PS 637: Performed by: OBSTETRICS & GYNECOLOGY

## 2021-10-22 PROCEDURE — 36415 COLL VENOUS BLD VENIPUNCTURE: CPT | Performed by: OBSTETRICS & GYNECOLOGY

## 2021-10-22 PROCEDURE — 722N000001 HC LABOR CARE VAGINAL DELIVERY SINGLE

## 2021-10-22 PROCEDURE — 84112 EVAL AMNIOTIC FLUID PROTEIN: CPT | Performed by: OBSTETRICS & GYNECOLOGY

## 2021-10-22 PROCEDURE — 87635 SARS-COV-2 COVID-19 AMP PRB: CPT | Performed by: ADVANCED PRACTICE MIDWIFE

## 2021-10-22 RX ORDER — DOCUSATE SODIUM 100 MG/1
100 CAPSULE, LIQUID FILLED ORAL DAILY
Status: DISCONTINUED | OUTPATIENT
Start: 2021-10-22 | End: 2021-10-24 | Stop reason: HOSPADM

## 2021-10-22 RX ORDER — MISOPROSTOL 200 UG/1
400 TABLET ORAL
Status: DISCONTINUED | OUTPATIENT
Start: 2021-10-22 | End: 2021-10-22 | Stop reason: HOSPADM

## 2021-10-22 RX ORDER — ONDANSETRON 4 MG/1
4 TABLET, ORALLY DISINTEGRATING ORAL EVERY 6 HOURS PRN
Status: DISCONTINUED | OUTPATIENT
Start: 2021-10-22 | End: 2021-10-22 | Stop reason: HOSPADM

## 2021-10-22 RX ORDER — OXYTOCIN 10 [USP'U]/ML
10 INJECTION, SOLUTION INTRAMUSCULAR; INTRAVENOUS
Status: DISCONTINUED | OUTPATIENT
Start: 2021-10-22 | End: 2021-10-24 | Stop reason: HOSPADM

## 2021-10-22 RX ORDER — IBUPROFEN 800 MG/1
800 TABLET, FILM COATED ORAL EVERY 6 HOURS PRN
Status: DISCONTINUED | OUTPATIENT
Start: 2021-10-22 | End: 2021-10-24 | Stop reason: HOSPADM

## 2021-10-22 RX ORDER — OXYTOCIN/0.9 % SODIUM CHLORIDE 30/500 ML
340 PLASTIC BAG, INJECTION (ML) INTRAVENOUS CONTINUOUS PRN
Status: DISCONTINUED | OUTPATIENT
Start: 2021-10-22 | End: 2021-10-24 | Stop reason: HOSPADM

## 2021-10-22 RX ORDER — NALOXONE HYDROCHLORIDE 0.4 MG/ML
0.2 INJECTION, SOLUTION INTRAMUSCULAR; INTRAVENOUS; SUBCUTANEOUS
Status: DISCONTINUED | OUTPATIENT
Start: 2021-10-22 | End: 2021-10-22 | Stop reason: HOSPADM

## 2021-10-22 RX ORDER — METHYLERGONOVINE MALEATE 0.2 MG/ML
200 INJECTION INTRAVENOUS
Status: DISCONTINUED | OUTPATIENT
Start: 2021-10-22 | End: 2021-10-24 | Stop reason: HOSPADM

## 2021-10-22 RX ORDER — MISOPROSTOL 200 UG/1
400 TABLET ORAL
Status: DISCONTINUED | OUTPATIENT
Start: 2021-10-22 | End: 2021-10-24 | Stop reason: HOSPADM

## 2021-10-22 RX ORDER — OXYTOCIN 10 [USP'U]/ML
10 INJECTION, SOLUTION INTRAMUSCULAR; INTRAVENOUS
Status: DISCONTINUED | OUTPATIENT
Start: 2021-10-22 | End: 2021-10-22 | Stop reason: HOSPADM

## 2021-10-22 RX ORDER — IBUPROFEN 600 MG/1
600 TABLET, FILM COATED ORAL
Status: DISCONTINUED | OUTPATIENT
Start: 2021-10-22 | End: 2021-10-24 | Stop reason: HOSPADM

## 2021-10-22 RX ORDER — MISOPROSTOL 200 UG/1
800 TABLET ORAL
Status: DISCONTINUED | OUTPATIENT
Start: 2021-10-22 | End: 2021-10-22 | Stop reason: HOSPADM

## 2021-10-22 RX ORDER — MISOPROSTOL 100 UG/1
25 TABLET ORAL
Status: DISCONTINUED | OUTPATIENT
Start: 2021-10-22 | End: 2021-10-22 | Stop reason: HOSPADM

## 2021-10-22 RX ORDER — PROCHLORPERAZINE MALEATE 10 MG
10 TABLET ORAL EVERY 6 HOURS PRN
Status: DISCONTINUED | OUTPATIENT
Start: 2021-10-22 | End: 2021-10-22 | Stop reason: HOSPADM

## 2021-10-22 RX ORDER — ONDANSETRON 2 MG/ML
4 INJECTION INTRAMUSCULAR; INTRAVENOUS EVERY 6 HOURS PRN
Status: DISCONTINUED | OUTPATIENT
Start: 2021-10-22 | End: 2021-10-22 | Stop reason: HOSPADM

## 2021-10-22 RX ORDER — LIDOCAINE 40 MG/G
CREAM TOPICAL
Status: DISCONTINUED | OUTPATIENT
Start: 2021-10-22 | End: 2021-10-22 | Stop reason: HOSPADM

## 2021-10-22 RX ORDER — OXYTOCIN/0.9 % SODIUM CHLORIDE 30/500 ML
340 PLASTIC BAG, INJECTION (ML) INTRAVENOUS CONTINUOUS PRN
Status: DISCONTINUED | OUTPATIENT
Start: 2021-10-22 | End: 2021-10-22 | Stop reason: HOSPADM

## 2021-10-22 RX ORDER — METHYLERGONOVINE MALEATE 0.2 MG/ML
200 INJECTION INTRAVENOUS
Status: DISCONTINUED | OUTPATIENT
Start: 2021-10-22 | End: 2021-10-22 | Stop reason: HOSPADM

## 2021-10-22 RX ORDER — CARBOPROST TROMETHAMINE 250 UG/ML
250 INJECTION, SOLUTION INTRAMUSCULAR
Status: DISCONTINUED | OUTPATIENT
Start: 2021-10-22 | End: 2021-10-22 | Stop reason: HOSPADM

## 2021-10-22 RX ORDER — KETOROLAC TROMETHAMINE 30 MG/ML
30 INJECTION, SOLUTION INTRAMUSCULAR; INTRAVENOUS
Status: DISCONTINUED | OUTPATIENT
Start: 2021-10-22 | End: 2021-10-24 | Stop reason: HOSPADM

## 2021-10-22 RX ORDER — METOCLOPRAMIDE 10 MG/1
10 TABLET ORAL EVERY 6 HOURS PRN
Status: DISCONTINUED | OUTPATIENT
Start: 2021-10-22 | End: 2021-10-22 | Stop reason: HOSPADM

## 2021-10-22 RX ORDER — MISOPROSTOL 200 UG/1
800 TABLET ORAL
Status: DISCONTINUED | OUTPATIENT
Start: 2021-10-22 | End: 2021-10-24 | Stop reason: HOSPADM

## 2021-10-22 RX ORDER — CARBOPROST TROMETHAMINE 250 UG/ML
250 INJECTION, SOLUTION INTRAMUSCULAR
Status: DISCONTINUED | OUTPATIENT
Start: 2021-10-22 | End: 2021-10-24 | Stop reason: HOSPADM

## 2021-10-22 RX ORDER — METOCLOPRAMIDE HYDROCHLORIDE 5 MG/ML
10 INJECTION INTRAMUSCULAR; INTRAVENOUS EVERY 6 HOURS PRN
Status: DISCONTINUED | OUTPATIENT
Start: 2021-10-22 | End: 2021-10-22 | Stop reason: HOSPADM

## 2021-10-22 RX ORDER — ACETAMINOPHEN 325 MG/1
650 TABLET ORAL EVERY 4 HOURS PRN
Status: DISCONTINUED | OUTPATIENT
Start: 2021-10-22 | End: 2021-10-24 | Stop reason: HOSPADM

## 2021-10-22 RX ORDER — NALOXONE HYDROCHLORIDE 0.4 MG/ML
0.4 INJECTION, SOLUTION INTRAMUSCULAR; INTRAVENOUS; SUBCUTANEOUS
Status: DISCONTINUED | OUTPATIENT
Start: 2021-10-22 | End: 2021-10-22 | Stop reason: HOSPADM

## 2021-10-22 RX ORDER — HYDROCORTISONE 2.5 %
CREAM (GRAM) TOPICAL 3 TIMES DAILY PRN
Status: DISCONTINUED | OUTPATIENT
Start: 2021-10-22 | End: 2021-10-24 | Stop reason: HOSPADM

## 2021-10-22 RX ORDER — MODIFIED LANOLIN
OINTMENT (GRAM) TOPICAL
Status: DISCONTINUED | OUTPATIENT
Start: 2021-10-22 | End: 2021-10-24 | Stop reason: HOSPADM

## 2021-10-22 RX ORDER — OXYTOCIN/0.9 % SODIUM CHLORIDE 30/500 ML
100-340 PLASTIC BAG, INJECTION (ML) INTRAVENOUS CONTINUOUS PRN
Status: DISCONTINUED | OUTPATIENT
Start: 2021-10-22 | End: 2021-10-24 | Stop reason: HOSPADM

## 2021-10-22 RX ORDER — PROCHLORPERAZINE 25 MG
25 SUPPOSITORY, RECTAL RECTAL EVERY 12 HOURS PRN
Status: DISCONTINUED | OUTPATIENT
Start: 2021-10-22 | End: 2021-10-22 | Stop reason: HOSPADM

## 2021-10-22 RX ORDER — BISACODYL 10 MG
10 SUPPOSITORY, RECTAL RECTAL DAILY PRN
Status: DISCONTINUED | OUTPATIENT
Start: 2021-10-22 | End: 2021-10-24 | Stop reason: HOSPADM

## 2021-10-22 RX ADMIN — Medication: at 17:05

## 2021-10-22 RX ADMIN — IBUPROFEN 800 MG: 800 TABLET, FILM COATED ORAL at 17:03

## 2021-10-22 RX ADMIN — Medication: at 22:50

## 2021-10-22 RX ADMIN — LIDOCAINE HYDROCHLORIDE 1 ML: 10 INJECTION, SOLUTION INFILTRATION; PERINEURAL at 13:42

## 2021-10-22 RX ADMIN — IBUPROFEN 800 MG: 800 TABLET, FILM COATED ORAL at 22:36

## 2021-10-22 RX ADMIN — Medication 340 ML/HR: at 13:42

## 2021-10-22 RX ADMIN — WITCH HAZEL: 500 SOLUTION RECTAL; TOPICAL at 17:06

## 2021-10-22 ASSESSMENT — ACTIVITIES OF DAILY LIVING (ADL)
ADLS_ACUITY_SCORE: 4

## 2021-10-22 ASSESSMENT — MIFFLIN-ST. JEOR: SCORE: 1776.88

## 2021-10-22 NOTE — L&D DELIVERY NOTE
Delivery Summary    Yue Hernandez MRN# [id number]   Age: 26 year old YOB: 1995     ASSESSMENT & PLAN:        Mary, Male-Yue [[id number]]    Labor Event Times    Labor onset date: 10/22/21 Onset time:  8:00 AM   Dilation complete date: 10/22/21 Complete time:  1:10 PM   Start pushing date/time: 10/22/2021 1320      Labor Length    1st Stage (hrs): 5 (min): 10   2nd Stage (hrs): 0 (min): 27   3rd Stage (hrs): 0 (min): 3      Labor Events     labor?: No   steroids: None  Labor Type: Spontaneous  Predominate monitoring during 1st stage: intermittent auscultation     Antibiotics received during labor?: No     Rupture date/time: 10/22/21 0320   Rupture type: Spontaneous rupture of membranes occuring during spontaneous labor or augmentation  Fluid color: Clear     Augmentation: None  1:1 continuous labor support provided by?: none Labor partogram used?: no      Delivery/Placenta Date and Time    Delivery Date: 10/22/21 Delivery Time:  1:37 PM   Placenta Date/Time: 10/22/2021  1:40 PM  Oxytocin given at the time of delivery: after delivery of placenta  Delivering clinician: Rancho Engel MD   Other personnel present at delivery:  Provider Role   Britton Liriano RN Delivery Nurse   Maureen Martin RN Registered Nurse   Rancho Engel MD          Vaginal Counts     Initial count performed by 2 team members:  Two Team Members   Dr Maren Liriano RN       Needles Suture Needles Sponges (RETIRED) Instruments   Initial counts 1 1 5    Added to count       Relief counts       Final counts             Placed during labor Accounted for at the end of labor   FSE NA NA   IUPC NA NA   Cervadil NA NA                     Apgars    Living status: Living   1 Minute 5 Minute 10 Minute 15 Minute 20 Minute   Skin color: 1  1       Heart rate: 2  2       Reflex irritability: 2  2       Muscle tone: 1  2       Respiratory effort: 2  2       Total: 8  9       Apgars assigned  "by: GETACHEW LIRIANO RN     Cord    Vessels: 3 Vessels    Cord Complications: None               Cord Blood Disposition: Discard    Gases Sent?: No    Delayed cord clamping?: Yes    Cord Clamping Delay (seconds): >120 seconds    Stem cell collection?: No        Resuscitation    Methods: None  Output in Delivery Room: Voided     Saxon Measurements    Weight: 6 lb 3.8 oz Length: 1' 7.88\"   Head circumference: 32 cm    Output in delivery room: Voided     Skin to Skin and Feeding Plan    Skin to skin initiation date/time: 1/10/1841    Skin to skin with: Mother  Skin to skin end date/time:        Labor Events and Shoulder Dystocia    Fetal Tracing Prior to Delivery: Category 1  Shoulder dystocia present?: Neg     Delivery (Maternal) (Provider to Complete) ([id number])    Episiotomy: None  Perineal lacerations: 2nd    Est. blood loss (mL): 300  Repair suture: 3-0 Vicryl  Number of repair packets: 1  Genital tract inspection done: Pos     Blood Loss  Mother: Yue Hernandez #[id number]   Start of Mother's Information    Delivery Blood Loss  10/22/21 0800 - 10/22/21 1531    EBL (mL) Hospital Encounter 300 mL    Total  300 mL         End of Mother's Information  Mother: Yue Hernandez #[id number]          Delivery - Provider to Complete ([id number])    Delivering clinician: Rancho Engel MD  Attempted Delivery Types (Choose all that apply): Spontaneous Vaginal Delivery  Delivery Type (Choose the 1 that will go to the Birth History): Vaginal, Spontaneous                   Other personnel:  Provider Role   Britton Liriano RN Delivery Nurse   Maureen Martin RN Registered Nurse   Rancho Engel MD                 Placenta    Date/Time: 10/22/2021  1:40 PM  Removal: Spontaneous  Disposition: Hospital disposal           Anesthesia    Method: None                Presentation and Position    Presentation: Vertex    Position: Left Occiput Anterior                 Rancho Engel MD  "

## 2021-10-22 NOTE — H&P
[facility] Labor and Delivery History and Physical    Yue Hernandez MRN# [id number]   Age: 26 year old YOB: 1995     Date of Admission:  10/22/2021    Primary care provider: Yudi Lira           Chief Complaint:   Yue Hernandez is a 26 year old female who is 38w1d pregnant and being admitted for SROM earlier this morning. Mild contractions since then.           Pregnancy history:     OBSTETRIC HISTORY:    OB History    Para Term  AB Living   1 0 0 0 0 0   SAB TAB Ectopic Multiple Live Births   0 0 0 0 0      # Outcome Date GA Lbr John/2nd Weight Sex Delivery Anes PTL Lv   1 Current                EDC: Estimated Date of Delivery: 21    Prenatal Labs: No results found for: ABO, RH, AS, HEPBANG, CHPCRT, GCPCRT, TREPAB, RUBELLAABIGG, HGB, HIV    GBS Status:   No results found for: GBS    Active Problem List  Patient Active Problem List   Diagnosis     Diet controlled gestational diabetes mellitus (GDM) in third trimester     Encounter for triage in pregnant patient       Medication Prior to Admission  Medications Prior to Admission   Medication Sig Dispense Refill Last Dose     Prenatal MV & Min w/FA-DHA (PRENATAL ADULT GUMMY/DHA/FA PO) Take 2 each by mouth every 24 hours   10/21/2021 at 0800     acetone urine (KETOSTIX) test strip Use 1 strip per day to check urine for ketones. 50 strip 3      blood glucose (ACCU-CHEK GUIDE) test strip Use to test blood sugar 4 times day. Accu-Chek Guide test strips or dispense per insurance at pharmacy discretion. 125 strip 3      blood glucose monitoring (SOFTCLIX) lancets Use to test blood sugar 4 times daily. Softclix lancets or dispense brand per insurance at pharmacy discretion. 100 each 4      Blood Glucose Monitoring Suppl (ACCU-CHEK GUIDE ME) w/Device KIT 1 each 4 times daily Accu-Chek Guide Me meter or dispense per insurance at pharmacy discretion. 1 kit 1    .        Maternal Past Medical History:     Past Medical  History:   Diagnosis Date     Uncomplicated asthma                        Family History:   This patient has no significant family history            Social History:   This patient has no significant social history         Review of Systems:   CONSTITUTIONAL: NEGATIVE for fever, chills, change in weight  ENT/MOUTH: NEGATIVE for ear, mouth and throat problems  RESP: NEGATIVE for significant cough or SOB  CV: NEGATIVE for chest pain, palpitations or peripheral edema          Physical Exam:   Vitals were reviewed  Temp: 99.2  F (37.3  C) Temp src: Oral BP: 128/73 Pulse: 87   Resp: 18 SpO2: 99 %      Constitutional:   awake, alert, cooperative, no apparent distress, and appears stated age     Lungs:   No increased work of breathing, good air exchange, clear to auscultation bilaterally, no crackles or wheezing     Cardiovascular:   regular rate and rhythm     Abdomen:   No scars, normal bowel sounds, soft, non-distended, non-tender, no masses palpated, no hepatosplenomegally      Cervix: per RN   Membranes: SROM   Dilation: 1-2   Effacement: 50%   Station:-2   Consistency: soft   Position: Mid  Presentation:Vertex  Fetal Heart Rate Tracing: Cat 1  Tocometer: irregular-mild                       Assessment:   Yue Hernandez is a 38w1d pregnant female admitted with SROM.  GDM-diet controlled        Plan:   Admit - see IP orders  Will see if she starts to labor this morning. If not, will ripen with Cytotec..    Bin Sands MD

## 2021-10-23 LAB — GLUCOSE BLDC GLUCOMTR-MCNC: 104 MG/DL (ref 70–99)

## 2021-10-23 PROCEDURE — C9803 HOPD COVID-19 SPEC COLLECT: HCPCS

## 2021-10-23 PROCEDURE — 120N000001 HC R&B MED SURG/OB

## 2021-10-23 PROCEDURE — 250N000013 HC RX MED GY IP 250 OP 250 PS 637: Performed by: OBSTETRICS & GYNECOLOGY

## 2021-10-23 RX ORDER — DIPHENHYDRAMINE HCL 25 MG
25 CAPSULE ORAL EVERY 6 HOURS PRN
Status: DISCONTINUED | OUTPATIENT
Start: 2021-10-23 | End: 2021-10-24 | Stop reason: HOSPADM

## 2021-10-23 RX ADMIN — DIPHENHYDRAMINE HYDROCHLORIDE 25 MG: 25 CAPSULE ORAL at 11:16

## 2021-10-23 RX ADMIN — ACETAMINOPHEN 650 MG: 325 TABLET ORAL at 15:46

## 2021-10-23 RX ADMIN — DOCUSATE SODIUM 100 MG: 100 CAPSULE, LIQUID FILLED ORAL at 11:16

## 2021-10-23 RX ADMIN — IBUPROFEN 800 MG: 800 TABLET, FILM COATED ORAL at 06:22

## 2021-10-23 RX ADMIN — IBUPROFEN 800 MG: 800 TABLET, FILM COATED ORAL at 20:03

## 2021-10-23 RX ADMIN — IBUPROFEN 800 MG: 800 TABLET, FILM COATED ORAL at 12:58

## 2021-10-23 RX ADMIN — Medication: at 15:47

## 2021-10-23 RX ADMIN — Medication: at 15:54

## 2021-10-23 ASSESSMENT — ACTIVITIES OF DAILY LIVING (ADL)
ADLS_ACUITY_SCORE: 4

## 2021-10-23 NOTE — PROGRESS NOTES
"POST-PARTUM PROGRESS NOTE    Assessment and Plan: Patient is a 26 year old year old  admitted on 10/22/2021. She delivered a healthy infant via . Patient is doing well overall and has no concerns.   1. Postpartum day 1  2. Normal postpartum course.  3. Continue to encourage and assist her with breastfeeding and routine  cares  4. Continue ibuprofen for pain control  5. Encourage ambulation  6. Anticipate discharge tomorrow AM      Patient discussed with attending physician, Dr. Rancho Engel , who agrees with the plan.    Grace Hagan MD PGY1 10/23/2021  [facility]    Subjective:  The patient feels well. She is bonding with the infant. Voiding without difficulty, lochia normal, tolerating normal diet, and passing flatus.  No bowel movement yet today. Pain is well controlled with current medications.  The patient has no emotional concerns to report at this time. The baby is well and being fed by breast.    Objective:  /65 (BP Location: Right arm)   Pulse 93   Temp 98.6  F (37  C) (Oral)   Resp 16   Ht 1.575 m (5' 2\")   Wt 108.9 kg (240 lb)   SpO2 98%   Breastfeeding Unknown   BMI 43.90 kg/m    GEN: NAD  CV: RRR, no murmurs  PULM: CTAB  ABD: Fundus firm and 2cm below umbilicus, NTTP  EXT: Warm, dry and well perfused. No edema. Calves NTTP and equal.     Prenatal Labs:   Lab Results   Component Value Date    AS Negative 10/22/2021     No results found for: HGB         Immunization History   Administered Date(s) Administered     COVID-19,PF,Moderna 2020, 2021       "

## 2021-10-23 NOTE — LACTATION NOTE
Met with Yue to initiate pumping due to baby being in SCN for hypoglycemia.  This is her first baby and baby was breastfeeding well prior to being admitted to scn.  We reviewed how to use the Symphony in INITIATE setting using 24mm flanges.   She was able to pump collectable amounts of colostrum and she said she had been leaking prior to delivery.  She has a Freemie breast pump for use at home and knows how to use it.  Baby will eat next at 12:45 and will be there if mom plans to breast feed.  Will follow up as needed.

## 2021-10-24 VITALS
WEIGHT: 240 LBS | HEIGHT: 62 IN | TEMPERATURE: 97.6 F | HEART RATE: 83 BPM | SYSTOLIC BLOOD PRESSURE: 119 MMHG | DIASTOLIC BLOOD PRESSURE: 73 MMHG | BODY MASS INDEX: 44.16 KG/M2 | OXYGEN SATURATION: 99 % | RESPIRATION RATE: 16 BRPM

## 2021-10-24 PROBLEM — O24.410 DIET CONTROLLED GESTATIONAL DIABETES MELLITUS (GDM) IN THIRD TRIMESTER: Status: RESOLVED | Noted: 2021-08-05 | Resolved: 2021-10-24

## 2021-10-24 PROBLEM — O42.90 DELAYED DELIVERY AFTER SROM (SPONTANEOUS RUPTURE OF MEMBRANES): Status: RESOLVED | Noted: 2021-10-22 | Resolved: 2021-10-24

## 2021-10-24 PROBLEM — Z36.89 ENCOUNTER FOR TRIAGE IN PREGNANT PATIENT: Status: RESOLVED | Noted: 2021-10-22 | Resolved: 2021-10-24

## 2021-10-24 PROCEDURE — 250N000013 HC RX MED GY IP 250 OP 250 PS 637: Performed by: OBSTETRICS & GYNECOLOGY

## 2021-10-24 RX ORDER — ACETAMINOPHEN 325 MG/1
650 TABLET ORAL EVERY 4 HOURS PRN
COMMUNITY
Start: 2021-10-24

## 2021-10-24 RX ORDER — DOCUSATE SODIUM 100 MG/1
100 CAPSULE, LIQUID FILLED ORAL DAILY
COMMUNITY
Start: 2021-10-25

## 2021-10-24 RX ORDER — IBUPROFEN 800 MG/1
800 TABLET, FILM COATED ORAL EVERY 6 HOURS PRN
Status: ON HOLD | COMMUNITY
Start: 2021-10-24 | End: 2022-12-06

## 2021-10-24 RX ADMIN — ACETAMINOPHEN 650 MG: 325 TABLET ORAL at 08:00

## 2021-10-24 RX ADMIN — IBUPROFEN 800 MG: 800 TABLET, FILM COATED ORAL at 05:05

## 2021-10-24 RX ADMIN — DOCUSATE SODIUM 100 MG: 100 CAPSULE, LIQUID FILLED ORAL at 08:00

## 2021-10-24 RX ADMIN — IBUPROFEN 800 MG: 800 TABLET, FILM COATED ORAL at 11:17

## 2021-10-24 RX ADMIN — ACETAMINOPHEN 650 MG: 325 TABLET ORAL at 13:09

## 2021-10-24 RX ADMIN — ACETAMINOPHEN 650 MG: 325 TABLET ORAL at 01:01

## 2021-10-24 RX ADMIN — WITCH HAZEL 1 EACH: 500 SOLUTION RECTAL; TOPICAL at 11:20

## 2021-10-24 ASSESSMENT — ACTIVITIES OF DAILY LIVING (ADL)
ADLS_ACUITY_SCORE: 4

## 2021-10-24 NOTE — PLAN OF CARE
Problem: Adult Inpatient Plan of Care  Goal: Plan of Care Review  Outcome: Improving  Flowsheets (Taken 10/24/2021 031)  Outcome Summary: Psychosocial needs swill be met related to 's admission to [facility].  Patient is doing well. VSS. Assessment WNL. Fundus is firm at U/2 with scant Lochia. No clots noted. Voiding well. Up ambulating to [facility] and tolerating well. Addressed emotions concerning NB being in [facility]. Encouraged verbalization and offered emotional support. Patient states it's difficult not having baby in room with them, but reports he's getting the necessary care he needs to get better. Reports they have been kept updated on baby and all questions and concerns have been addressed. Patient has been pumping every 3 hours and getting 1 to 2 ml. Will rest overnight and resume pumping in early AM. Discussed with patient the importance of getting adequate rest to foster healing, milk production and energy. Leg/foot massage and prayed offered before sleep and patient accepted. Will continue to monitor and assess.

## 2021-10-24 NOTE — DISCHARGE SUMMARY
[facility] Discharge Summary    Yue Hernandez MRN# [id number]   Age: 26 year old YOB: 1995     Date of Admission:  10/22/2021  Date of Discharge::  10/24/2021  Admitting Physician:  Bin Sands MD  Discharge Physician:  Ivett Ferreira MD               Admission Diagnoses:   Encounter for triage in pregnant patient [Z36.89]  Delayed delivery after SROM (spontaneous rupture of membranes) [O42.90]          Discharge Diagnosis:   Normal spontaneous vaginal delivery  Intrauterine pregnancy at 38 weeks gestation          Procedures:   Procedure(s): No additional procedures performed       -           Medications Prior to Admission:     Medications Prior to Admission   Medication Sig Dispense Refill Last Dose     Prenatal MV & Min w/FA-DHA (PRENATAL ADULT GUMMY/DHA/FA PO) Take 2 each by mouth every 24 hours   10/21/2021 at 0800     acetone urine (KETOSTIX) test strip Use 1 strip per day to check urine for ketones. 50 strip 3      blood glucose (ACCU-CHEK GUIDE) test strip Use to test blood sugar 4 times day. Accu-Chek Guide test strips or dispense per insurance at pharmacy discretion. 125 strip 3      blood glucose monitoring (SOFTCLIX) lancets Use to test blood sugar 4 times daily. Softclix lancets or dispense brand per insurance at pharmacy discretion. 100 each 4      Blood Glucose Monitoring Suppl (ACCU-CHEK GUIDE ME) w/Device KIT 1 each 4 times daily Accu-Chek Guide Me meter or dispense per insurance at pharmacy discretion. 1 kit 1              Discharge Medications:     Current Discharge Medication List      START taking these medications    Details   acetaminophen (TYLENOL) 325 MG tablet Take 2 tablets (650 mg) by mouth every 4 hours as needed for mild pain or fever (greater than or equal to 38  C /100.4  F (oral) or 38.5  C/ 101.4  F (core).)    Associated Diagnoses: Single liveborn infant delivered vaginally      docusate sodium (COLACE) 100 MG capsule Take 1 capsule (100  mg) by mouth daily    Associated Diagnoses: Single liveborn infant delivered vaginally      ibuprofen (ADVIL/MOTRIN) 800 MG tablet Take 1 tablet (800 mg) by mouth every 6 hours as needed for other (cramping)    Associated Diagnoses: Single liveborn infant delivered vaginally         CONTINUE these medications which have NOT CHANGED    Details   Prenatal MV & Min w/FA-DHA (PRENATAL ADULT GUMMY/DHA/FA PO) Take 2 each by mouth every 24 hours      acetone urine (KETOSTIX) test strip Use 1 strip per day to check urine for ketones.  Qty: 50 strip, Refills: 3    Associated Diagnoses: Gestational diabetes      blood glucose (ACCU-CHEK GUIDE) test strip Use to test blood sugar 4 times day. Accu-Chek Guide test strips or dispense per insurance at pharmacy discretion.  Qty: 125 strip, Refills: 3    Associated Diagnoses: Gestational diabetes      blood glucose monitoring (SOFTCLIX) lancets Use to test blood sugar 4 times daily. Softclix lancets or dispense brand per insurance at pharmacy discretion.  Qty: 100 each, Refills: 4    Associated Diagnoses: Gestational diabetes      Blood Glucose Monitoring Suppl (ACCU-CHEK GUIDE ME) w/Device KIT 1 each 4 times daily Accu-Chek Guide Me meter or dispense per insurance at pharmacy discretion.  Qty: 1 kit, Refills: 1    Associated Diagnoses: Gestational diabetes                   Consultations:   No consultations were requested during this admission          Brief History of Labor:   Yue Hernandez is a 26 year old female who is 38w1d pregnant and being admitted for SROM earlier this morning. Mild contractions since then.  Progressed through labor and delivered via  on 10/21/2021.           Hospital Course:   The patient's hospital course was unremarkable.  On discharge, her pain was well controlled. Vaginal bleeding is similar to peak menstrual flow.  Voiding without difficulty.  Ambulating well and tolerating a normal diet.  No fever.  Breastfeeding going well, desires outpatient  lactation follo-wup.  Infant is stable.  No bowel movement yet.  She was discharged on post-partum day #2.    Post-partum hemoglobin: No results found for: HGB          Discharge Instructions and Follow-Up:   Discharge diet: Regular   Discharge activity: Pelvic rest: abstain from intercourse and do not use tampons for 6 week(s)   Discharge follow-up: Follow up with OB in 6 weeks   Wound care: NA           Discharge Disposition:   Discharged to home       Ivett Ferreira MD

## 2021-10-26 ENCOUNTER — LACTATION ENCOUNTER (OUTPATIENT)
Age: 26
End: 2021-10-26

## 2021-10-26 NOTE — LACTATION NOTE
This note was copied from a baby's chart.  Request by rn to assist with breast feeding.  This is Yue's first baby and he's in SCN for hypoglycemia.  Yue was discharged yesterday and has been pumping at home using a Freemie breast pump and getting 1 ounce every 3 hours.  This is his first attempt at breast feeding, he was just bathed and is awake and cueing to feed.  Using football hold on the right breast with nipple shield due to flat nipples, baby was able to latch easily, showed mom how to check and flange lips to get a better latch.  Baby fed on the right with swallows for approx. 10 minutes.  Baby came off on his own, burped and attempted latch on the left in cross cradle.  Baby latched but wasn't real vigorous, but did feed for a bit.  We discussed feeding baby 8 times in 24 hours, offering both breasts every feeding, offer supplement of mbm after and pump for 5-10 minutes until breasts empty.  We reviewed lactation resources in education folder; OP Lactation clinic, online resources and ECFE Virtual class.  Will follow up as needed.

## 2021-10-26 NOTE — PROGRESS NOTES
"Outreach Note for EPIC    Chart reviewed, discharge plan discussed with patient, needs assessed. Patient verbalizes understanding of plan, [facility], [facility] nurse visit planned for Wed, Oct 27th, Home Care Intake updated. Patient and , \"Luis M\", phone number is reported as correct in EMR.    Patient states she has good support at home, has baby care essentials, and feels ready to discharge today. Outreach RN will continue to follow and assist if needed with discharge plan. No additional needs identified at this time.        "

## 2021-12-12 ENCOUNTER — HEALTH MAINTENANCE LETTER (OUTPATIENT)
Age: 26
End: 2021-12-12

## 2022-06-15 PROBLEM — Z34.80 SUPERVISION OF OTHER NORMAL PREGNANCY: Status: ACTIVE | Noted: 2022-06-15

## 2022-06-20 LAB
HIV1+2 AB SERPL QL IA: NONREACTIVE
RUBELLA ANTIBODY IGG (EXTERNAL): NORMAL
VDRL (SYPHILIS) (EXTERNAL): NONREACTIVE

## 2022-06-24 LAB — HEPATITIS B SURFACE ANTIGEN (EXTERNAL): NEGATIVE

## 2022-07-26 ENCOUNTER — TRANSFERRED RECORDS (OUTPATIENT)
Dept: HEALTH INFORMATION MANAGEMENT | Facility: CLINIC | Age: 27
End: 2022-07-26

## 2022-07-27 ENCOUNTER — MEDICAL CORRESPONDENCE (OUTPATIENT)
Dept: HEALTH INFORMATION MANAGEMENT | Facility: CLINIC | Age: 27
End: 2022-07-27

## 2022-07-28 ENCOUNTER — TELEPHONE (OUTPATIENT)
Dept: MULTI SPECIALTY CLINIC | Facility: CLINIC | Age: 27
End: 2022-07-28

## 2022-07-28 NOTE — TELEPHONE ENCOUNTER
External Referral  [facility]  Dr. Yudi Lira  DX: HX of GDM, Z86.32    Records/referral received today 7/28/2022 2:50pm, records sent to scan

## 2022-08-08 NOTE — TELEPHONE ENCOUNTER
08.08- Left message informing [facility] [phone] of no response to reach the patient and the secondary phone # given is an old Employer phone number.    Manual Repair Warning Statement: We plan on removing the manually selected variable below in favor of our much easier automatic structured text blocks found in the previous tab. We decided to do this to help make the flow better and give you the full power of structured data. Manual selection is never going to be ideal in our platform and I would encourage you to avoid using manual selection from this point on, especially since I will be sunsetting this feature. It is important that you do one of two things with the customized text below. First, you can save all of the text in a word file so you can have it for future reference. Second, transfer the text to the appropriate area in the Library tab. Lastly, if there is a flap or graft type which we do not have you need to let us know right away so I can add it in before the variable is hidden. No need to panic, we plan to give you roughly 6 months to make the change.

## 2022-09-14 ENCOUNTER — VIRTUAL VISIT (OUTPATIENT)
Dept: EDUCATION SERVICES | Facility: CLINIC | Age: 27
End: 2022-09-14
Payer: COMMERCIAL

## 2022-09-14 DIAGNOSIS — O24.419 GDM (GESTATIONAL DIABETES MELLITUS): Primary | ICD-10-CM

## 2022-09-14 PROCEDURE — G0108 DIAB MANAGE TRN  PER INDIV: HCPCS | Mod: AE | Performed by: DIETITIAN, REGISTERED

## 2022-09-14 RX ORDER — BLOOD SUGAR DIAGNOSTIC
STRIP MISCELLANEOUS
Qty: 100 STRIP | Refills: 6 | Status: SHIPPED | OUTPATIENT
Start: 2022-09-14

## 2022-09-14 RX ORDER — BLOOD-GLUCOSE METER
1 EACH MISCELLANEOUS PRN
Qty: 1 KIT | Refills: 0 | Status: SHIPPED | OUTPATIENT
Start: 2022-09-14

## 2022-09-14 RX ORDER — LANCETS
EACH MISCELLANEOUS
Qty: 100 EACH | Refills: 6 | Status: SHIPPED | OUTPATIENT
Start: 2022-09-14

## 2022-09-14 NOTE — LETTER
9/14/2022         RE: Yue Hernandez  [address] [place] [address] [address]  [facility] [address]        Dear Colleague,    Thank you for referring your patient, Yue Hernandez, to the [facility]. Please see a copy of my visit note below.    Diabetes Self-Management Education & Support    SUBJECTIVE/OBJECTIVE:  Presents for education related to gestational diabetes.    Accompanied by: Self  Diabetes management related comments/concerns: Hx of GDM  Gestational weeks: 25w  Hospital planned for delivery: [place]  Next OB Visit Date: 09/15/22  Number of previous pregnancies: 1  Had any babies over 9 lbs: No  Previously had Gestational Diabetes: Yes  Had Diabetes Education before: Yes  Previous insulin or other diabetes medication during that pregnancy: No  Have you ever had thyroid problems or taken thyroid medication?: No  Heart disease, mitral valve prolapse or rheumatic fever?: No  Hypertension : No  High Cholesterol: No  High Triglycerides: No  Do you use tobacco products?: No  Do you drink beer, wine or hard liquor?: No    Cultural Influences/Ethnic Background:  Not  or     Estimated Date of Delivery: Data Unavailable    1 hour OGTT  No results found for: GLU1      3 hour OGTT    Fasting  No results found for: GTTGF    1 hour  No results found for: GTTG1    2 hour  No results found for: GTTG2    3 hour  No results found for: GTTG3    Lifestyle and Health Behaviors:  Pre-pregnancy weight (lbs): 235  Exercise:: Yes  Days per week of moderate to strenuous exercise (like a brisk walk): 7 (walks several times a day, keeps busy with her 11 mon old)  How intense was your typical exercise? : Light (like stretching or slow walking)  Meal planning/habits: Low carb (always does protein, good carbs and veg)  Meals include: Breakfast, Lunch, Dinner  Breakfast: greek yogurt  Lunch: salmon, brown rice, broccoli  Dinner: meatloaf, green beans, potatoes  Snacks: cheese stick, biscuits, sausage  Beverages:  Water, Diet soda, Milk  How many servings of fruits/vegetables per day: 3  Pre- vitamin?: Yes  Supplements?: No  Experiencing nausea?: No  Experiencing heartburn?: No    Healthy Coping:  Emotional response to diabetes: Ready to learn  Informal Support system:: Family, Spouse  Stage of change: PREPARATION (Decided to change - considering how)    Current Management:   GDM is being managed by diet and activity    ASSESSMENT:  Initial GDM visit.  Patient's 2nd pregnancy. Hx of GDM - diet controlled   Reports good support from family:spouse    Is being mindful with her intake - more protein     Daily walks and regular activity.    Is comfortable with home monitoring of BG but does not want to pay too much form out of pocket. Is nervous about spending ~ $100 that she did when she picked up her meter + testing supplies for her previous pregnancy. Writer called patient's retail pharmacy x 3 later but could not get through. Also called [place] [facility] and spoke with Sabina (pharmacy tech) who informed that they will be able to process all of patient's prescriptions once the patient calls and sets up her account with them and shares insurance information. All the prescriptions were sent in. Writer called patient back and left her a voicemail and also sent a [employer] message.    No other concerns today     INTERVENTION:  Rx for glucose meter and testing supplies was sent in.    Educational topics covered today:  GDM diagnosis, pathophysiology, Risks and Complications of GDM, Means of controlling GDM, Using a Blood Glucose Monitor, Blood Glucose Goals, Logging and Interpreting Glucose Results, Ketone Testing, When to Call a Diabetes Educator or OB Provider, Healthy Eating During Pregnancy, Counting Carbohydrates, Meal Planning for GDM, and Physical Activity    Educational materials provided today:     Emailed with permission:  [place] Understanding Gestational Diabetes  GDM Log Book  Snack list    Pt verbalized  understanding of concepts discussed and recommendations provided today.     PLAN:    Rx for glucose meter + testing supplies was sent in to  specialty pharmacy.   Also left a voicemail for the patient about it.   And sent a mychart message to patient to call [facility] at: [phone], option #4 and provide insurance and other necessary information so they can expedite the processing of your prescriptions.     Have also asked patient to call us at: [phone] if she runs into issues or has not received her prescriptions by next Monday/Tuesday or for any other questions/concerns    Check glucose 4 times daily, before breakfast and 1 hour after each meal.     Check Ketones daily for one week, if negative, reduce testing to once a week.     Physical activity recommended: at least 30 mins daily as able/safe. Staying active for 10-15 mins after meals/snacks helps lower BG.    Meal plan: 30-45 carbs at breakfast, 45-60 carbs at lunch, 45-60 carbs at supper, 15-30 carbs at 3 snacks a day.  Follow consistent CHO meal plan, eat CHO and protein/fat at all meals/snacks.    Call/e-mail/MyChart message diabetes educator if 3 or more blood sugars are above the goal in 1 week, if ketones are positive, or with questions/concerns.    F/u:9/22  OBGYN:9/15  Instructed pt to always bring their meter, BG log to all clinic visits - pt expressed understanding    Time Spent: 60 minutes  Encounter Type: Individual    Any diabetes medication dose changes were made via the CDE Protocol and Collaborative Practice Agreement with the patient's referring provider. A copy of this encounter was shared with the provider.

## 2022-09-14 NOTE — PROGRESS NOTES
Diabetes Self-Management Education & Support    SUBJECTIVE/OBJECTIVE:  Presents for education related to gestational diabetes.    Accompanied by: Self  Diabetes management related comments/concerns: Hx of GDM  Gestational weeks: 25w  Hospital planned for delivery: [facility]  Next OB Visit Date: 09/15/22  Number of previous pregnancies: 1  Had any babies over 9 lbs: No  Previously had Gestational Diabetes: Yes  Had Diabetes Education before: Yes  Previous insulin or other diabetes medication during that pregnancy: No  Have you ever had thyroid problems or taken thyroid medication?: No  Heart disease, mitral valve prolapse or rheumatic fever?: No  Hypertension : No  High Cholesterol: No  High Triglycerides: No  Do you use tobacco products?: No  Do you drink beer, wine or hard liquor?: No    Cultural Influences/Ethnic Background:  Not  or     Estimated Date of Delivery: Data Unavailable    1 hour OGTT  No results found for: GLU1      3 hour OGTT    Fasting  No results found for: GTTGF    1 hour  No results found for: GTTG1    2 hour  No results found for: GTTG2    3 hour  No results found for: GTTG3    Lifestyle and Health Behaviors:  Pre-pregnancy weight (lbs): 235  Exercise:: Yes  Days per week of moderate to strenuous exercise (like a brisk walk): 7 (walks several times a day, keeps busy with her 11 mon old)  How intense was your typical exercise? : Light (like stretching or slow walking)  Meal planning/habits: Low carb (always does protein, good carbs and veg)  Meals include: Breakfast, Lunch, Dinner  Breakfast: greek yogurt  Lunch: salmon, brown rice, broccoli  Dinner: meatloaf, green beans, potatoes  Snacks: cheese stick, biscuits, sausage  Beverages: Water, Diet soda, Milk  How many servings of fruits/vegetables per day: 3  Pre- vitamin?: Yes  Supplements?: No  Experiencing nausea?: No  Experiencing heartburn?: No    Healthy Coping:  Emotional response to diabetes: Ready to learn  Informal  Support system:: Family, Spouse  Stage of change: PREPARATION (Decided to change - considering how)    Current Management:   GDM is being managed by diet and activity    ASSESSMENT:  Initial GDM visit.  Patient's 2nd pregnancy. Hx of GDM - diet controlled   Reports good support from family:spouse    Is being mindful with her intake - more protein     Daily walks and regular activity.    Is comfortable with home monitoring of BG but does not want to pay too much form out of pocket. Is nervous about spending ~ $100 that she did when she picked up her meter + testing supplies for her previous pregnancy. Writer called patient's retail pharmacy x 3 later but could not get through. Also called [place] [facility] and spoke with Sabina (pharmacy tech) who informed that they will be able to process all of patient's prescriptions once the patient calls and sets up her account with them and shares insurance information. All the prescriptions were sent in. Writer called patient back and left her a voicemail and also sent a [email or web address] message.    No other concerns today     INTERVENTION:  Rx for glucose meter and testing supplies was sent in.    Educational topics covered today:  GDM diagnosis, pathophysiology, Risks and Complications of GDM, Means of controlling GDM, Using a Blood Glucose Monitor, Blood Glucose Goals, Logging and Interpreting Glucose Results, Ketone Testing, When to Call a Diabetes Educator or OB Provider, Healthy Eating During Pregnancy, Counting Carbohydrates, Meal Planning for GDM, and Physical Activity    Educational materials provided today:     Emailed with permission:  [place] Understanding Gestational Diabetes  GDM Log Book  Snack list    Pt verbalized understanding of concepts discussed and recommendations provided today.     PLAN:    Rx for glucose meter + testing supplies was sent in to  specialty pharmacy.   Also left a voicemail for the patient about it.   And sent a [email or web address] message to  patient to call [facility] at: [phone], option #4 and provide insurance and other necessary information so they can expedite the processing of your prescriptions.     Have also asked patient to call us at: [phone] if she runs into issues or has not received her prescriptions by next Monday/Tuesday or for any other questions/concerns    Check glucose 4 times daily, before breakfast and 1 hour after each meal.     Check Ketones daily for one week, if negative, reduce testing to once a week.     Physical activity recommended: at least 30 mins daily as able/safe. Staying active for 10-15 mins after meals/snacks helps lower BG.    Meal plan: 30-45 carbs at breakfast, 45-60 carbs at lunch, 45-60 carbs at supper, 15-30 carbs at 3 snacks a day.  Follow consistent CHO meal plan, eat CHO and protein/fat at all meals/snacks.    Call/e-mail/MyChart message diabetes educator if 3 or more blood sugars are above the goal in 1 week, if ketones are positive, or with questions/concerns.    F/u:9/22  OBGYN:9/15  Instructed pt to always bring their meter, BG log to all clinic visits - pt expressed understanding    Time Spent: 60 minutes  Encounter Type: Individual    Any diabetes medication dose changes were made via the CDE Protocol and Collaborative Practice Agreement with the patient's referring provider. A copy of this encounter was shared with the provider.

## 2022-09-15 ENCOUNTER — TRANSFERRED RECORDS (OUTPATIENT)
Dept: HEALTH INFORMATION MANAGEMENT | Facility: CLINIC | Age: 27
End: 2022-09-15

## 2022-09-15 ENCOUNTER — LAB REQUISITION (OUTPATIENT)
Dept: LAB | Facility: CLINIC | Age: 27
End: 2022-09-15
Payer: COMMERCIAL

## 2022-09-15 DIAGNOSIS — Z3A.37 37 WEEKS GESTATION OF PREGNANCY: ICD-10-CM

## 2022-09-15 DIAGNOSIS — O24.410 GESTATIONAL DIABETES MELLITUS IN PREGNANCY, DIET CONTROLLED: ICD-10-CM

## 2022-09-15 LAB — HGB BLD-MCNC: 11.7 G/DL (ref 11.7–15.7)

## 2022-09-15 PROCEDURE — 83036 HEMOGLOBIN GLYCOSYLATED A1C: CPT | Mod: ORL | Performed by: OBSTETRICS & GYNECOLOGY

## 2022-09-15 PROCEDURE — 85018 HEMOGLOBIN: CPT | Mod: ORL | Performed by: OBSTETRICS & GYNECOLOGY

## 2022-09-16 LAB — HBA1C MFR BLD: 6.3 %

## 2022-09-22 ENCOUNTER — VIRTUAL VISIT (OUTPATIENT)
Dept: EDUCATION SERVICES | Facility: CLINIC | Age: 27
End: 2022-09-22
Payer: COMMERCIAL

## 2022-09-22 DIAGNOSIS — O24.419 GDM (GESTATIONAL DIABETES MELLITUS): Primary | ICD-10-CM

## 2022-09-22 PROCEDURE — G0108 DIAB MANAGE TRN  PER INDIV: HCPCS | Mod: AE | Performed by: DIETITIAN, REGISTERED

## 2022-09-22 NOTE — PROGRESS NOTES
Diabetes Self-Management Education & Support    SUBJECTIVE/OBJECTIVE:  Presents for education related to gestational diabetes.    Cultural Influences/Ethnic Background:  Not  or       There were no vitals taken for this visit.    At 26 weeks gestation.    Estimated Date of Delivery: 12/25/22    Blood Glucose/Ketone Log:   Date Ketones Fasting Post Breakfast Post Lunch Post Supper   9/22 n 100 120 130 --   9/21 n 96 142 (CHO heavy meal) 128 115 (2 hr)   9/20 trace 97 130 150 (ate out) 135   9/19 trace 96 119 130 --   9/18 trace 92 116 (2 hr) 126 138   9/17 n 95 132 125 140 (CHO heavy meal)   9/16 n 98 125 133 131       Lifestyle and Health Behaviors:  Is trying to follow GDM diet and activity guidelines.     Healthy Coping:  Good family support.     Current Management:  GDM is being managed by diet and activity currently    ASSESSMENT:  Ketones: neg to trace, per pt.   Fasting blood glucoses: 20% in target.  After breakfast: 85% in target.  After lunch: 85% in target.  After dinner: 85% in target.    GDM f/u visit.  Hx of GDM with her previous pregnancy - diet controlled     Pt has been testing BG 4x/d and daily ketones.  She has continued to use her own glucose meter, testing supplies and ketostix and has not been able to call  Specialty Pharmacy yet - they had left her a voicemail earlier this week. She has their phone number and plans to call them today and provide insurance and other necessary information so they can expedite the processing of her prescriptions.     Have asked patient to call diabetes education at: [phone] if she runs into issues or for any other questions/concerns    ++ Rx for glucose meter + testing supplies and ketostix was sent in to  specialty pharmacy at the previous visit    Consumes 3 meals + 2-4 snacks   Food recall:  BF: PB toast   L: chicken Kwame  D: Meatloaf  Snacks: cheese stick, crackers, granola, nuts     Walks 10-15 mins after meals     No other concerns  today.    INTERVENTION:  Educational topics covered today:  When to Call a Diabetes Educator or OB Provider  We reviewed GDM diet and activity guidelines   We discussed eating enough calories, CHO, protein and also a HS snack with CHO and protein. Also encouraged adequate hydration. Instructed pt to call if any higher than trace ketones.   We also discussed hyperglycemia during pregnancy and the potential need for insulin for further control: pt expressed understanding and agreeable to reassess.    Educational Materials provided today:  n/a    PLAN:    Pt to call [facility] at [phone], option 4 to provide insurance and other necessary information so they can expedite the processing of her prescriptions.     Have asked patient to call diabetes education at: [phone] if she runs into issues or for any other questions/concerns    Pt wants to focus on following GDM diet guidelines and is aware of the potential need for insulin for further control. We will assess for insulin at the f/u visit.    Check glucose 4 times daily, before breakfast and 1 hour after each meal.     Check Ketones daily for one week, if negative, reduce testing to once a week.     Physical activity recommended: at least 30 mins daily as able/safe. Staying active for 10-15 mins after meals/snacks helps lower BG.    Meal plan: 30-45 carbs at breakfast, 45-60 carbs at lunch, 45-60 carbs at supper, 15-30 carbs at 3 snacks a day.  Follow consistent CHO meal plan, eat CHO and protein/fat at all meals/snacks.    Call/e-mail/MyChart message diabetes educator if 3 or more blood sugars are above the goal in 1 week, if ketones are positive, or with questions/concerns.    F/u:have requested our team schedulers to call patient for her follow up diabetes ed appointment.  OBGYN:next month (pt did not remember the date)  Instructed pt to always bring their meter, BG log to all clinic visits - pt expressed understanding    Time Spent: 60  minutes  Encounter Type: Individual    Any diabetes medication dose changes were made via the CDE Protocol and Collaborative Practice Agreement with the patient's referring provider. A copy of this encounter was shared with the provider.

## 2022-09-22 NOTE — LETTER
9/22/2022         RE: Yue Hernandez  [address] [place]  [facility] [address]        Dear Colleague,    Thank you for referring your patient, Yue Hernandez, to the [facility]. Please see a copy of my visit note below.    Diabetes Self-Management Education & Support    SUBJECTIVE/OBJECTIVE:  Presents for education related to gestational diabetes.    Cultural Influences/Ethnic Background:  Not  or       There were no vitals taken for this visit.    At 26 weeks gestation.    Estimated Date of Delivery: 12/25/22    Blood Glucose/Ketone Log:   Date Ketones Fasting Post Breakfast Post Lunch Post Supper   9/22 n 100 120 130 --   9/21 n 96 142 (CHO heavy meal) 128 115 (2 hr)   9/20 trace 97 130 150 (ate out) 135   9/19 trace 96 119 130 --   9/18 trace 92 116 (2 hr) 126 138   9/17 n 95 132 125 140 (CHO heavy meal)   9/16 n 98 125 133 131       Lifestyle and Health Behaviors:  Is trying to follow GDM diet and activity guidelines.     Healthy Coping:  Good family support.     Current Management:  GDM is being managed by diet and activity currently    ASSESSMENT:  Ketones: neg to trace, per pt.   Fasting blood glucoses: 20% in target.  After breakfast: 85% in target.  After lunch: 85% in target.  After dinner: 85% in target.    GDM f/u visit.  Hx of GDM with her previous pregnancy - diet controlled     Pt has been testing BG 4x/d and daily ketones.  She has continued to use her own glucose meter, testing supplies and ketostix and has not been able to call [facility] yet - they had left her a voicemail earlier this week. She has their phone number and plans to call them today and provide insurance and other necessary information so they can expedite the processing of her prescriptions.     Have asked patient to call diabetes education at: [phone] if she runs into issues or for any other questions/concerns    ++ Rx for glucose meter + testing supplies and ketostix was  sent in to  specialty pharmacy at the previous visit    Consumes 3 meals + 2-4 snacks   Food recall:  BF: PB toast   L: chicken Kwame  D: Meatloaf  Snacks: cheese stick, crackers, granola, nuts     Walks 10-15 mins after meals     No other concerns today.    INTERVENTION:  Educational topics covered today:  When to Call a Diabetes Educator or OB Provider  We reviewed GDM diet and activity guidelines   We discussed eating enough calories, CHO, protein and also a HS snack with CHO and protein. Also encouraged adequate hydration. Instructed pt to call if any higher than trace ketones.   We also discussed hyperglycemia during pregnancy and the potential need for insulin for further control: pt expressed understanding and agreeable to reassess.    Educational Materials provided today:  n/a    PLAN:    Pt to call [facility] at [phone], option 4 to provide insurance and other necessary information so they can expedite the processing of her prescriptions.     Have asked patient to call diabetes education at: [phone] if she runs into issues or for any other questions/concerns    Pt wants to focus on following GDM diet guidelines and is aware of the potential need for insulin for further control. We will assess for insulin at the f/u visit.    Check glucose 4 times daily, before breakfast and 1 hour after each meal.     Check Ketones daily for one week, if negative, reduce testing to once a week.     Physical activity recommended: at least 30 mins daily as able/safe. Staying active for 10-15 mins after meals/snacks helps lower BG.    Meal plan: 30-45 carbs at breakfast, 45-60 carbs at lunch, 45-60 carbs at supper, 15-30 carbs at 3 snacks a day.  Follow consistent CHO meal plan, eat CHO and protein/fat at all meals/snacks.    Call/e-mail/MyChart message diabetes educator if 3 or more blood sugars are above the goal in 1 week, if ketones are positive, or with questions/concerns.    F/u:have  requested our team schedulers to call patient for her follow up diabetes ed appointment.  OBGYN:next month (pt did not remember the date)  Instructed pt to always bring their meter, BG log to all clinic visits - pt expressed understanding    Time Spent: 60 minutes  Encounter Type: Individual    Any diabetes medication dose changes were made via the CDE Protocol and Collaborative Practice Agreement with the patient's referring provider. A copy of this encounter was shared with the provider.         Statement Selected

## 2022-10-03 ENCOUNTER — HEALTH MAINTENANCE LETTER (OUTPATIENT)
Age: 27
End: 2022-10-03

## 2022-10-19 ENCOUNTER — VIRTUAL VISIT (OUTPATIENT)
Dept: EDUCATION SERVICES | Facility: CLINIC | Age: 27
End: 2022-10-19
Payer: COMMERCIAL

## 2022-10-19 DIAGNOSIS — O24.419 GDM (GESTATIONAL DIABETES MELLITUS): Primary | ICD-10-CM

## 2022-10-19 PROCEDURE — 98967 PH1 ASSMT&MGMT NQHP 11-20: CPT | Mod: 95 | Performed by: DIETITIAN, REGISTERED

## 2022-10-19 NOTE — PROGRESS NOTES
Diabetes Self-Management Education & Support  Type of Service: Telephone Visit/ 15minutes     Originating Location (Patient Location): Pt in vehicle - reports pulled over and not driving  Distant Location (Provider Location): Home  Mode of Communication:  Telephone     Telephone Visit Start Time: 10:43 AM  Telephone Visit End Time (telephone visit stop time): 10:58 AM     How would patient like to obtain AVS? Pool    SUBJECTIVE/OBJECTIVE:  Presents for education related to gestational diabetes.    Accompanied by: Self  Diabetes management related comments/concerns: Hx of GDM  Gestational weeks: 30w3d  Next OB Visit Date: 10/19/22  Number of previous pregnancies: 1  Had any babies over 9 lbs: No  Previously had Gestational Diabetes: Yes  Had Diabetes Education before: Yes  Previous insulin or other diabetes medication during that pregnancy: No  Have you ever had thyroid problems or taken thyroid medication?: No  Heart disease, mitral valve prolapse or rheumatic fever?: No  Hypertension : No  High Cholesterol: No  High Triglycerides: No  Do you use tobacco products?: No  Do you drink beer, wine or hard liquor?: No    Cultural Influences/Ethnic Background:  Not  or       There were no vitals taken for this visit.    Estimated Date of Delivery: Dec 25, 2022    Blood Glucose/Ketone Log:   DATE Ketones Fasting BG (mg/dL) 1 hour Post Breakfast BG (mg/dL) 1 hour Post Lunch BG (mg/dL) 1 hour post Dinner BG (mg/dL)   10/19 negative 87 122 - -   10/18 negative 92 125 130 140   10/17 trace 98 122 135 141   10/16 negative 92 130 122 130   10/15 trace 95 138 125 120   10/14 trace 95 123 135 140   10/13 negative 98 130 124 131   10/12 negative 78 130 127 132   Ketones negative or trace    Lifestyle and Health Behaviors:  Pre-pregnancy weight (lbs): 235  Exercise:: Yes  Days per week of moderate to strenuous exercise (like a brisk walk): 7 (walks several times a day, keeps busy with her 11 mon old)  How intense was  your typical exercise? : Light (like stretching or slow walking)  Cultural/Hinduism diet restrictions?: No  Meal planning/habits: Low carb (always does protein, good carbs and veg)  Meals include: Breakfast, Lunch, Dinner  Breakfast: greek yogurt Or peanut butter  Lunch: salmon, brown rice, brocolli  Dinner: Time of dinner varies: 530-730 pm: meatloaf, green beans, potatoes  Snacks: 10 pm: cheese and crackers OR nuts OR chips OR yogurt  Beverages: Water, Diet soda, Milk  How many servings of fruits/vegetables per day: 3  Pre-gregory vitamin?: Yes  Supplements?: No  Experiencing nausea?: No  Experiencing heartburn?: No    Healthy Coping:  Emotional response to diabetes: Ready to learn  Informal Support system:: Family, Spouse  Stage of change: PREPARATION (Decided to change - considering how)    Current Management:  Taking medications for gestational diabetes?: No  Difficulty affording diabetes medication?: No  Difficulty affording diabetes testing supplies?: No    ASSESSMENT:  Ketones: 100% in target.   Fasting blood glucoses: 50% in target.  After breakfast: 100% in target.  After lunch: 100% in target.  After dinner: 57% in target.    Reviewed elevated fasting BG and some tips for HS snacks. Advised patient to try to adjust her snack to have 15-30 grams of carbohydrates plus a protein. If BG continue to be elevated will start bedtime insulin. Patient aware to mychart her blood glucose readings in 1 week. Patient typically can figure out why her post dinner meal numbers are elevated - usually too much carbohydrate (potatoes or pasta).  Also reviewed postpartum care.    INTERVENTION:  Educational topics covered today:  What to expect after delivery, Future testing for Type 2 diabetes (2 hour OGTT at 6 week post-partum check-up and annual fasting blood glucose level), Risk of GDM and planning ahead for future pregnancies, Recommended lifestyle interventions for reducing the risk of Type 2 Diabetes, When to Call a  Diabetes Educator or OB Provider    Educational Materials provided today:  Cyndi Preventing Diabetes    PLAN:  Check glucose 4 times daily.  Check ketones once a week when readings are consistently negative.  Continue with recommended physical activity.  Continue to follow recommended meal plan: 2 carbs at breakfast, 4 carbs at lunch, 4 carbs at supper, 1-2 carbs at snacks.  Follow consistent CHO meal plan, eat CHO and protein/fat at all meals/snacks.    Call/e-mail/MyChart message diabetes educator if 3 or more blood sugars are above the goal in 1 week or if ketones are positive.      Time Spent: 15 minutes  Encounter Type: Individual    Any diabetes medication dose changes were made via the CDE Protocol and Collaborative Practice Agreement with the patient's referring provider. A copy of this encounter was shared with the provider.

## 2022-10-19 NOTE — PATIENT INSTRUCTIONS
Goals for Gestational Diabetes Care:    1. Eat balanced meals (3 meals + 3 snacks daily)    Breakfast: 30 grams carbohydrate + Protein  Snack: 15-30 grams carbohydrate + protein  Lunch: 45-60 grams carbohydrate + protein/vegetables  Snack: 15-30 grams Carbohydrate + protein  Dinner: 45-60 grams carbohydrate + protein/vegetables  Bedtime Snack: 15-30 grams + protein    ----Make sure you include protein source with each meal and at bedtime - this has been shown to help with blood glucose elevations    2. Each Morning Check Ketones (small/mod/high - call Diabetes Care Line)  Your goal is negative or trace ketones. If you have ketones in your urine it means you are not eating enough before you go to bed. Eat a larger bedtime snack and include protein.     3. Aim to get at least 30 minutes of activity each day. Activity really helps improve blood sugars.     4. Blood Glucose Targets:   1. Fasting Less than 95 mg/dL   2. 1 hours after a meal target is less than 140 mg/dL  ----Always remember to bring meter and log book to all appointments.      Follow up with your Diabetic Educator as needed to assess BG targets in 1 week via mychart  If Blood glucose levels are above normal 3 times or more in one week and you cannot explain them or if you develop small, moderate or high ketones call Diabetes Care at [phone]    Call with any questions.    Thank you,  Page Carranza RDN, CARMELLA, [facility]   Certified Diabetes Care &   [phone]

## 2022-10-19 NOTE — LETTER
10/19/2022         RE: Yue Hernandez  [address] [place] [address] [address] [place]  [facility] [address]        Dear Colleague,    Thank you for referring your patient, Yue Hernandez, to the [facility]. Please see a copy of my visit note below.    Diabetes Self-Management Education & Support  Type of Service: Telephone Visit/ 15minutes     Originating Location (Patient Location): Pt in vehicle - reports pulled over and not driving  Distant Location (Provider Location): Home  Mode of Communication:  Telephone     Telephone Visit Start Time: 10:43 AM  Telephone Visit End Time (telephone visit stop time): 10:58 AM     How would patient like to obtain AVS? MyChart    SUBJECTIVE/OBJECTIVE:  Presents for education related to gestational diabetes.    Accompanied by: Self  Diabetes management related comments/concerns: Hx of GDM  Gestational weeks: 30w3d  Next OB Visit Date: 10/19/22  Number of previous pregnancies: 1  Had any babies over 9 lbs: No  Previously had Gestational Diabetes: Yes  Had Diabetes Education before: Yes  Previous insulin or other diabetes medication during that pregnancy: No  Have you ever had thyroid problems or taken thyroid medication?: No  Heart disease, mitral valve prolapse or rheumatic fever?: No  Hypertension : No  High Cholesterol: No  High Triglycerides: No  Do you use tobacco products?: No  Do you drink beer, wine or hard liquor?: No    Cultural Influences/Ethnic Background:  Not  or       There were no vitals taken for this visit.    Estimated Date of Delivery: Dec 25, 2022    Blood Glucose/Ketone Log:   DATE Ketones Fasting BG (mg/dL) 1 hour Post Breakfast BG (mg/dL) 1 hour Post Lunch BG (mg/dL) 1 hour post Dinner BG (mg/dL)   10/19 negative 87 122 - -   10/18 negative 92 125 130 140   10/17 trace 98 122 135 141   10/16 negative 92 130 122 130   10/15 trace 95 138 125 120   10/14 trace 95 123 135 140   10/13 negative 98 130 124 131   10/12 negative 78 130 127 132    Ketones negative or trace    Lifestyle and Health Behaviors:  Pre-pregnancy weight (lbs): 235  Exercise:: Yes  Days per week of moderate to strenuous exercise (like a brisk walk): 7 (walks several times a day, keeps busy with her 11 mon old)  How intense was your typical exercise? : Light (like stretching or slow walking)  Cultural/Episcopal diet restrictions?: No  Meal planning/habits: Low carb (always does protein, good carbs and veg)  Meals include: Breakfast, Lunch, Dinner  Breakfast: greek yogurt Or peanut butter  Lunch: salmon, brown rice, brocolli  Dinner: Time of dinner varies: 530-730 pm: meatloaf, green beans, potatoes  Snacks: 10 pm: cheese and crackers OR nuts OR chips OR yogurt  Beverages: Water, Diet soda, Milk  How many servings of fruits/vegetables per day: 3  Pre- vitamin?: Yes  Supplements?: No  Experiencing nausea?: No  Experiencing heartburn?: No    Healthy Coping:  Emotional response to diabetes: Ready to learn  Informal Support system:: Family, Spouse  Stage of change: PREPARATION (Decided to change - considering how)    Current Management:  Taking medications for gestational diabetes?: No  Difficulty affording diabetes medication?: No  Difficulty affording diabetes testing supplies?: No    ASSESSMENT:  Ketones: 100% in target.   Fasting blood glucoses: 50% in target.  After breakfast: 100% in target.  After lunch: 100% in target.  After dinner: 57% in target.    Reviewed elevated fasting BG and some tips for HS snacks. Advised patient to try to adjust her snack to have 15-30 grams of carbohydrates plus a protein. If BG continue to be elevated will start bedtime insulin. Patient aware to mychart her blood glucose readings in 1 week. Patient typically can figure out why her post dinner meal numbers are elevated - usually too much carbohydrate (potatoes or pasta).  Also reviewed postpartum care.    INTERVENTION:  Educational topics covered today:  What to expect after delivery, Future  testing for Type 2 diabetes (2 hour OGTT at 6 week post-partum check-up and annual fasting blood glucose level), Risk of GDM and planning ahead for future pregnancies, Recommended lifestyle interventions for reducing the risk of Type 2 Diabetes, When to Call a Diabetes Educator or OB Provider    Educational Materials provided today:  Cyndi Preventing Diabetes    PLAN:  Check glucose 4 times daily.  Check ketones once a week when readings are consistently negative.  Continue with recommended physical activity.  Continue to follow recommended meal plan: 2 carbs at breakfast, 4 carbs at lunch, 4 carbs at supper, 1-2 carbs at snacks.  Follow consistent CHO meal plan, eat CHO and protein/fat at all meals/snacks.    Call/e-mail/DoubleRecallhart message diabetes educator if 3 or more blood sugars are above the goal in 1 week or if ketones are positive.      Time Spent: 15 minutes  Encounter Type: Individual    Any diabetes medication dose changes were made via the CDE Protocol and Collaborative Practice Agreement with the patient's referring provider. A copy of this encounter was shared with the provider.

## 2022-11-04 ENCOUNTER — MYC MEDICAL ADVICE (OUTPATIENT)
Dept: EDUCATION SERVICES | Facility: CLINIC | Age: 27
End: 2022-11-04

## 2022-11-04 NOTE — TELEPHONE ENCOUNTER
Gestational Diabetes Follow-up    Subjective/Objective:    Yue Hernandez sent in blood glucose log for review. Last date of communication was: 10/19.    Gestational diabetes is being managed with diet and activity    Taking diabetes medications: no    Estimated Date of Delivery: Dec 25, 2022    BG/Food Lo/4  No keytones   Fast: 92  Breakfast: 131  Lunch: 140  Dinner: 109     11/3   Trace keytones   Fast: 94  Breakfast: 122  Lunch: 103  Dinner: 135        No keytones   Fast: 96  Breakfast: 124  Lunch: 119       No keytones   Fast: 90  Breakfast: 111  Lunch: 133  Dinner: 127     10/31  Trace keytones   Fast: 97  Breakfast: 128  Lunch: 130  Dinner: 210      10/30  Trace keytones   Fast: 94  Breakfast: 117  Lunch: 190  Dinner: 140     10/29  No keytones   Fast: 94  Breakfast: 134  Lunch: 115  Dinner: 145    Assessment:    Ketones: neg.   Fasting blood glucoses: 71% in target.  After breakfast: 100% in target.  After lunch: 86% in target.  After dinner: 71% in target.    Plan/Response:  No changes in the patient's current treatment plan.  Follow-up at appointment on     ABBY Burciaga CDCES    Any diabetes medication dose changes were made via the CDE Protocol and Collaborative Practice Agreement with the patient's referring provider. A copy of this encounter was shared with the provider.

## 2022-11-07 ENCOUNTER — TELEPHONE (OUTPATIENT)
Dept: EDUCATION SERVICES | Facility: CLINIC | Age: 27
End: 2022-11-07

## 2022-11-07 NOTE — TELEPHONE ENCOUNTER
Patient was a no show to her scheduled diabetes education GDM visit today.    Please contact patient to get rescheduled.    Thank you,  Page Carranza RDN, LD, [facility]   Certified Diabetes Care &   [phone]

## 2022-11-18 ENCOUNTER — E-VISIT (OUTPATIENT)
Dept: URGENT CARE | Facility: URGENT CARE | Age: 27
End: 2022-11-18
Payer: COMMERCIAL

## 2022-11-18 DIAGNOSIS — R06.2 WHEEZING: Primary | ICD-10-CM

## 2022-11-18 NOTE — PATIENT INSTRUCTIONS
Dear Yue Hernandez,    We are sorry you are not feeling well. Based on the responses you provided, it is recommended that you be seen in-person in urgent care so we can better evaluate your symptoms. Please click here to find the nearest urgent care location to you.   You will not be charged for this Visit. Thank you for trusting us with your care.    NINOSKA Patel CNP

## 2022-11-28 ENCOUNTER — TELEPHONE (OUTPATIENT)
Dept: EDUCATION SERVICES | Facility: CLINIC | Age: 27
End: 2022-11-28

## 2022-11-28 ENCOUNTER — VIRTUAL VISIT (OUTPATIENT)
Dept: EDUCATION SERVICES | Facility: CLINIC | Age: 27
End: 2022-11-28
Payer: COMMERCIAL

## 2022-11-28 DIAGNOSIS — O24.419 GDM (GESTATIONAL DIABETES MELLITUS): Primary | ICD-10-CM

## 2022-11-28 DIAGNOSIS — O24.419 GESTATIONAL DIABETES MELLITUS (GDM), ANTEPARTUM, GESTATIONAL DIABETES METHOD OF CONTROL UNSPECIFIED: Primary | ICD-10-CM

## 2022-11-28 PROCEDURE — 98967 PH1 ASSMT&MGMT NQHP 11-20: CPT | Performed by: DIETITIAN, REGISTERED

## 2022-11-28 RX ORDER — LANCETS
EACH MISCELLANEOUS
Qty: 100 EACH | Refills: 2 | Status: SHIPPED | OUTPATIENT
Start: 2022-11-28

## 2022-11-28 RX ORDER — INSULIN DETEMIR 100 [IU]/ML
8 INJECTION, SOLUTION SUBCUTANEOUS AT BEDTIME
Qty: 15 ML | Refills: 1 | Status: ON HOLD | OUTPATIENT
Start: 2022-11-28 | End: 2022-12-06

## 2022-11-28 RX ORDER — BLOOD SUGAR DIAGNOSTIC
STRIP MISCELLANEOUS
Qty: 200 STRIP | Refills: 2 | Status: SHIPPED | OUTPATIENT
Start: 2022-11-28

## 2022-11-28 RX ORDER — PEN NEEDLE, DIABETIC 30 GX3/16"
1 NEEDLE, DISPOSABLE MISCELLANEOUS 2 TIMES DAILY
Qty: 90 EACH | Refills: 1 | Status: SHIPPED | OUTPATIENT
Start: 2022-11-28

## 2022-11-28 RX ORDER — INSULIN LISPRO 100 [IU]/ML
3 INJECTION, SOLUTION INTRAVENOUS; SUBCUTANEOUS
Qty: 15 ML | Refills: 1 | Status: ON HOLD | OUTPATIENT
Start: 2022-11-28 | End: 2022-12-06

## 2022-11-28 RX ORDER — BLOOD PRESSURE TEST KIT
1 KIT MISCELLANEOUS 2 TIMES DAILY
Qty: 100 EACH | Refills: 1 | Status: SHIPPED | OUTPATIENT
Start: 2022-11-28

## 2022-11-28 NOTE — PATIENT INSTRUCTIONS
Meal Plan Recommendation: 30 carbs at breakfast, 45 carbs at lunch, 45 carbs at supper, 15-30 carbs at each of 3 snacks between meals    Exercise / activity plan: walk for 5-15 minutes after meals    Continue to check BG 4 times daily (fasting and one to two hour(s) after each meal).    Reduce ketone checks to once a week.    Recommend that patient begin Levemir 8 units at bedtime and 3 units of Humalog before dinner per discretion of Endocrinology.    Follow-up in 1 week with educator on 12/5/22 or in Pregnancy clinic the week of 12/5/22    Call [phone] for pregnancy related concerns or questions.

## 2022-11-28 NOTE — PROGRESS NOTES
Type of Service: Telephone Visit/ 20 minutes    Originating Location (Patient Location): Home  Distant Location (Provider Location): Home    Mode of Communication:  Telephone    Telephone Visit Start Time: 11:00  Telephone Visit End Time (telephone visit stop time): 11:20    How would patient like to obtain AVS? [employer]     Diabetes and Pregnancy Follow-up  Type of Service: Telephone Visit    How would patient like to obtain AVS? Not needed    Subjective/Objective:    Yue Hernandez was called for a scheduled BG review. Last date of communication was: 11/4/22 through [facility].    Gestational diabetes is being managed with diet and activity    Taking diabetes medications: no    Estimated Date of Delivery: Dec 25, 2022    Blood Glucose/Ketone Log:    Date Ketones Fasting Post Breakfast Post Lunch Post Supper   11/5/22 tr 99 125 130 145   11/6/22 n 95 130 132 141   11/7/22 tr 97 120 140 150   11/8/22 - - - - -   11/9/22 tr 97 130 128 116+2   11/10/22 tr 99 130 160(out to eat) 140   11/11/22 tr 92 125 127 138   11/12/22 n 95 132 135 140   11/13/22 n 90 138 125 119   11/14/22 tr 98 122 135 140   11/15/22 n 95 120 130 135   11/16/22 tr 92 120 130 140   11/17/22 n 92 116 126 138   11/18/22 n 96 120 119 160   11/19/22 n 99 130 180 145   11/20/22 n 97 144 129 115   11/21/22 tr 101 120 132 140   11/22/22 tr 100 130 130 210   11/23/22 tr 99 118 140 137   11/24/22 tr 97 120 130 150   11/25/22 tr 99 125 160(out to eat) 220   11/26/22 n 100 132 120 200   11/27/22 n 100 130 135 140   11/28/22 tr 98 114           Assessment:   FBS are elevated and meet criteria for insulin start per protocol. PP after dinner elevated, especially in this past week. Yue reported that she has been sick for the last two weeks.  She feels like she starts the day fairly well with healthy intake, but it declines as the day goes on.     Ketones: negative or trace.   Fasting blood glucoses: 30% in target.  After breakfast: 96% in target.  After lunch: 91% in  target.  After dinner: 60% in target.    Plan/Response:  Meal Plan Recommendation: 30 carbs at breakfast, 45 carbs at lunch, 45 carbs at supper, 15-30 carbs at each of 3 snacks between meals  Exercise / activity plan: walk for 5-15 minutes after meals  Continue to check BG 4 times daily (fasting and one to two hour(s) after each meal).  Reduce ketone checks to once a week.  Recommend that patient begin Levemir 8 units at bedtime and 3 units of Humalog before dinner per discretion of Endocrinology.  Follow-up in 1 week with educator on 12/5/22 or in Pregnancy clinic the week of 12/5/22      Time Spent: 20 minutes    Any diabetes medication dose changes were made via the CDE Protocol and Collaborative Practice Agreement with the patient's OB/GYN provider. A copy of this encounter was shared with the provider.

## 2022-11-28 NOTE — TELEPHONE ENCOUNTER
Please reach out to Yue to schedule her in the pregnancy clinic for the week of 12/5/22.  Yue does have an appointment scheduled with me for 12/5/22 in case the PC is full for next week.    Thank you,  Emeli Shukal

## 2022-11-28 NOTE — LETTER
11/28/2022         RE: Yue Hernandez  [address] [place]  [facility] [address]        Dear Colleague,    Thank you for referring your patient, Yue Hernandez, to the [facility]. Please see a copy of my visit note below.    Type of Service: Telephone Visit/ 20 minutes    Originating Location (Patient Location): Home  Distant Location (Provider Location): Home    Mode of Communication:  Telephone    Telephone Visit Start Time: 11:00  Telephone Visit End Time (telephone visit stop time): 11:20    How would patient like to obtain AVS? [employer]     Diabetes and Pregnancy Follow-up  Type of Service: Telephone Visit    How would patient like to obtain AVS? Not needed    Subjective/Objective:    Yue Hernandez was called for a scheduled BG review. Last date of communication was: 11/4/22 through [employer].    Gestational diabetes is being managed with diet and activity    Taking diabetes medications: no    Estimated Date of Delivery: Dec 25, 2022    Blood Glucose/Ketone Log:    Date Ketones Fasting Post Breakfast Post Lunch Post Supper   11/5/22 tr 99 125 130 145   11/6/22 n 95 130 132 141   11/7/22 tr 97 120 140 150   11/8/22 - - - - -   11/9/22 tr 97 130 128 116+2   11/10/22 tr 99 130 160 140   11/11/22 tr 92 125 127 138   11/12/22 n 95 132 135 140   11/13/22 n 90 138 125 119   11/14/22 tr 98 122 135 140   11/15/22 n 95 120 130 135   11/16/22 tr 92 120 130 140   11/17/22 n 92 116 126 138   11/18/22 n 96 120 119 160   11/19/22 n 99 130 180 145   11/20/22 n 97 144 129 115   11/21/22 tr 101 120 132 140   11/22/22 tr 100 130 130 210   11/23/22 tr 99 118 140 137   11/24/22 tr 97 120 130 150   11/25/22 tr 99 125 160 220   11/26/22 n 100 132 120 200   11/27/22 n 100 130 135 140   11/28/22 tr 98 114           Assessment:   Yue reported that she has been sick for the last two weeks.  She feels like she starts the day fairly well with healthy intake, but it declines as the day goes on.     Ketones:  negative or trace.   Fasting blood glucoses: 30% in target.  After breakfast: 96% in target.  After lunch: 96% in target.  After dinner: 60% in target.    Plan/Response:  Meal Plan Recommendation: 30 carbs at breakfast, 45 carbs at lunch, 45 carbs at supper, 15-30 carbs at each of 3 snacks between meals  Exercise / activity plan: walk for 5-15 minutes after meals  Continue to check BG 4 times daily (fasting and one to two hour(s) after each meal).  Reduce ketone checks to once a week.  Recommend that patient begin Levemir 8 units at bedtime and 3 units of Humalog before dinner per discretion of Endocrinology.  Follow-up in 1 week with educator on 12/5/22 or in Pregnancy clinic the week of 12/5/22      Time Spent: 20 minutes    Any diabetes medication dose changes were made via the CDE Protocol and Collaborative Practice Agreement with the patient's OB/GYN provider. A copy of this encounter was shared with the provider.

## 2022-11-28 NOTE — TELEPHONE ENCOUNTER
Emeli Hastings RD  P Diabetes Ed Endocrinology Scheduling Registration Pool    Dr. Alejandro thought she may have an appointment for pregnancy clinic for 12/2. If so, you can see if that would work for Yue and cancel the appointment she has with me for 12/5.  Thank you!

## 2022-11-30 ENCOUNTER — LAB REQUISITION (OUTPATIENT)
Dept: LAB | Facility: CLINIC | Age: 27
End: 2022-11-30
Payer: COMMERCIAL

## 2022-11-30 DIAGNOSIS — Z3A.33 33 WEEKS GESTATION OF PREGNANCY: ICD-10-CM

## 2022-11-30 DIAGNOSIS — O24.410 GESTATIONAL DIABETES MELLITUS IN PREGNANCY, DIET CONTROLLED: ICD-10-CM

## 2022-11-30 DIAGNOSIS — Z36.85 ENCOUNTER FOR ANTENATAL SCREENING FOR STREPTOCOCCUS B: ICD-10-CM

## 2022-11-30 LAB
HBA1C MFR BLD: 6.6 %
HGB BLD-MCNC: 11.4 G/DL (ref 11.7–15.7)

## 2022-11-30 PROCEDURE — 83036 HEMOGLOBIN GLYCOSYLATED A1C: CPT | Mod: ORL | Performed by: OBSTETRICS & GYNECOLOGY

## 2022-11-30 PROCEDURE — 85018 HEMOGLOBIN: CPT | Mod: ORL | Performed by: OBSTETRICS & GYNECOLOGY

## 2022-11-30 PROCEDURE — 87653 STREP B DNA AMP PROBE: CPT | Mod: ORL | Performed by: OBSTETRICS & GYNECOLOGY

## 2022-12-01 LAB — GP B STREP DNA SPEC QL NAA+PROBE: NEGATIVE

## 2022-12-04 ENCOUNTER — ANESTHESIA EVENT (OUTPATIENT)
Dept: OBGYN | Facility: HOSPITAL | Age: 27
End: 2022-12-04

## 2022-12-04 ENCOUNTER — HOSPITAL ENCOUNTER (INPATIENT)
Facility: HOSPITAL | Age: 27
LOS: 3 days | Discharge: HOME | End: 2022-12-07
Attending: OBSTETRICS & GYNECOLOGY | Admitting: OBSTETRICS & GYNECOLOGY
Payer: COMMERCIAL

## 2022-12-04 ENCOUNTER — ANESTHESIA (OUTPATIENT)
Dept: OBGYN | Facility: HOSPITAL | Age: 27
End: 2022-12-04

## 2022-12-04 PROBLEM — Z34.90 PREGNANCY: Status: ACTIVE | Noted: 2022-12-04

## 2022-12-04 LAB
ABO/RH(D): NORMAL
ANTIBODY SCREEN: NEGATIVE
GLUCOSE BLDC GLUCOMTR-MCNC: 101 MG/DL (ref 70–99)
GLUCOSE BLDC GLUCOMTR-MCNC: 106 MG/DL (ref 70–99)
GLUCOSE BLDC GLUCOMTR-MCNC: 118 MG/DL (ref 70–99)
GLUCOSE BLDC GLUCOMTR-MCNC: 130 MG/DL (ref 70–99)
KETONES BLD-SCNC: 0.2 MMOL/L (ref 0–0.6)
SARS-COV-2 RNA RESP QL NAA+PROBE: NEGATIVE
SPECIMEN EXPIRATION DATE: NORMAL
T PALLIDUM AB SER QL: NONREACTIVE

## 2022-12-04 PROCEDURE — 86850 RBC ANTIBODY SCREEN: CPT | Performed by: OBSTETRICS & GYNECOLOGY

## 2022-12-04 PROCEDURE — 258N000003 HC RX IP 258 OP 636: Performed by: OBSTETRICS & GYNECOLOGY

## 2022-12-04 PROCEDURE — 250N000009 HC RX 250: Performed by: OBSTETRICS & GYNECOLOGY

## 2022-12-04 PROCEDURE — U0005 INFEC AGEN DETEC AMPLI PROBE: HCPCS | Performed by: OBSTETRICS & GYNECOLOGY

## 2022-12-04 PROCEDURE — 120N000001 HC R&B MED SURG/OB

## 2022-12-04 PROCEDURE — 36415 COLL VENOUS BLD VENIPUNCTURE: CPT | Performed by: OBSTETRICS & GYNECOLOGY

## 2022-12-04 PROCEDURE — 0UQMXZZ REPAIR VULVA, EXTERNAL APPROACH: ICD-10-PCS | Performed by: OBSTETRICS & GYNECOLOGY

## 2022-12-04 PROCEDURE — 86901 BLOOD TYPING SEROLOGIC RH(D): CPT | Performed by: OBSTETRICS & GYNECOLOGY

## 2022-12-04 PROCEDURE — 86780 TREPONEMA PALLIDUM: CPT | Performed by: OBSTETRICS & GYNECOLOGY

## 2022-12-04 PROCEDURE — 722N000001 HC LABOR CARE VAGINAL DELIVERY SINGLE

## 2022-12-04 PROCEDURE — 250N000011 HC RX IP 250 OP 636: Performed by: OBSTETRICS & GYNECOLOGY

## 2022-12-04 PROCEDURE — 250N000013 HC RX MED GY IP 250 OP 250 PS 637: Performed by: OBSTETRICS & GYNECOLOGY

## 2022-12-04 PROCEDURE — 0KQM0ZZ REPAIR PERINEUM MUSCLE, OPEN APPROACH: ICD-10-PCS | Performed by: OBSTETRICS & GYNECOLOGY

## 2022-12-04 PROCEDURE — 999N000127 HC STATISTIC PERIPHERAL IV START W US GUIDANCE

## 2022-12-04 PROCEDURE — 82010 KETONE BODYS QUAN: CPT | Performed by: OBSTETRICS & GYNECOLOGY

## 2022-12-04 RX ORDER — NICOTINE POLACRILEX 4 MG
15-30 LOZENGE BUCCAL
Status: DISCONTINUED | OUTPATIENT
Start: 2022-12-04 | End: 2022-12-04

## 2022-12-04 RX ORDER — CARBOPROST TROMETHAMINE 250 UG/ML
250 INJECTION, SOLUTION INTRAMUSCULAR
Status: DISCONTINUED | OUTPATIENT
Start: 2022-12-04 | End: 2022-12-04 | Stop reason: HOSPADM

## 2022-12-04 RX ORDER — HYDROCORTISONE 25 MG/G
CREAM TOPICAL 3 TIMES DAILY PRN
Status: DISCONTINUED | OUTPATIENT
Start: 2022-12-04 | End: 2022-12-07 | Stop reason: HOSPADM

## 2022-12-04 RX ORDER — ONDANSETRON 2 MG/ML
4 INJECTION INTRAMUSCULAR; INTRAVENOUS EVERY 6 HOURS PRN
Status: DISCONTINUED | OUTPATIENT
Start: 2022-12-04 | End: 2022-12-04 | Stop reason: HOSPADM

## 2022-12-04 RX ORDER — CARBOPROST TROMETHAMINE 250 UG/ML
250 INJECTION, SOLUTION INTRAMUSCULAR
Status: DISCONTINUED | OUTPATIENT
Start: 2022-12-04 | End: 2022-12-07 | Stop reason: HOSPADM

## 2022-12-04 RX ORDER — DIPHENHYDRAMINE HYDROCHLORIDE 50 MG/ML
25 INJECTION INTRAMUSCULAR; INTRAVENOUS EVERY 6 HOURS PRN
Status: DISCONTINUED | OUTPATIENT
Start: 2022-12-04 | End: 2022-12-07 | Stop reason: HOSPADM

## 2022-12-04 RX ORDER — DIPHENHYDRAMINE HCL 25 MG
25 CAPSULE ORAL EVERY 6 HOURS PRN
Status: DISCONTINUED | OUTPATIENT
Start: 2022-12-04 | End: 2022-12-07 | Stop reason: HOSPADM

## 2022-12-04 RX ORDER — OXYTOCIN/0.9 % SODIUM CHLORIDE 30/500 ML
100-340 PLASTIC BAG, INJECTION (ML) INTRAVENOUS CONTINUOUS PRN
Status: DISCONTINUED | OUTPATIENT
Start: 2022-12-04 | End: 2022-12-07 | Stop reason: HOSPADM

## 2022-12-04 RX ORDER — NALOXONE HYDROCHLORIDE 0.4 MG/ML
0.4 INJECTION, SOLUTION INTRAMUSCULAR; INTRAVENOUS; SUBCUTANEOUS
Status: DISCONTINUED | OUTPATIENT
Start: 2022-12-04 | End: 2022-12-04 | Stop reason: HOSPADM

## 2022-12-04 RX ORDER — MISOPROSTOL 200 UG/1
800 TABLET ORAL
Status: DISCONTINUED | OUTPATIENT
Start: 2022-12-04 | End: 2022-12-04 | Stop reason: HOSPADM

## 2022-12-04 RX ORDER — CITRIC ACID/SODIUM CITRATE 334-500MG
30 SOLUTION, ORAL ORAL
Status: DISCONTINUED | OUTPATIENT
Start: 2022-12-04 | End: 2022-12-04 | Stop reason: HOSPADM

## 2022-12-04 RX ORDER — PROCHLORPERAZINE 25 MG
25 SUPPOSITORY, RECTAL RECTAL EVERY 12 HOURS PRN
Status: DISCONTINUED | OUTPATIENT
Start: 2022-12-04 | End: 2022-12-04 | Stop reason: HOSPADM

## 2022-12-04 RX ORDER — MISOPROSTOL 200 UG/1
400 TABLET ORAL
Status: DISCONTINUED | OUTPATIENT
Start: 2022-12-04 | End: 2022-12-04 | Stop reason: HOSPADM

## 2022-12-04 RX ORDER — FENTANYL CITRATE-0.9 % NACL/PF 10 MCG/ML
100 PLASTIC BAG, INJECTION (ML) INTRAVENOUS EVERY 5 MIN PRN
Status: DISCONTINUED | OUTPATIENT
Start: 2022-12-04 | End: 2022-12-04 | Stop reason: HOSPADM

## 2022-12-04 RX ORDER — KETOROLAC TROMETHAMINE 30 MG/ML
30 INJECTION, SOLUTION INTRAMUSCULAR; INTRAVENOUS
Status: DISCONTINUED | OUTPATIENT
Start: 2022-12-04 | End: 2022-12-07 | Stop reason: HOSPADM

## 2022-12-04 RX ORDER — SODIUM CHLORIDE 9 MG/ML
INJECTION, SOLUTION INTRAVENOUS CONTINUOUS
Status: DISCONTINUED | OUTPATIENT
Start: 2022-12-04 | End: 2022-12-04

## 2022-12-04 RX ORDER — DEXTROSE MONOHYDRATE 25 G/50ML
25-50 INJECTION, SOLUTION INTRAVENOUS
Status: DISCONTINUED | OUTPATIENT
Start: 2022-12-04 | End: 2022-12-07 | Stop reason: HOSPADM

## 2022-12-04 RX ORDER — BISACODYL 10 MG
10 SUPPOSITORY, RECTAL RECTAL DAILY PRN
Status: DISCONTINUED | OUTPATIENT
Start: 2022-12-04 | End: 2022-12-07 | Stop reason: HOSPADM

## 2022-12-04 RX ORDER — OXYTOCIN/0.9 % SODIUM CHLORIDE 30/500 ML
340 PLASTIC BAG, INJECTION (ML) INTRAVENOUS CONTINUOUS PRN
Status: DISCONTINUED | OUTPATIENT
Start: 2022-12-04 | End: 2022-12-04 | Stop reason: HOSPADM

## 2022-12-04 RX ORDER — DEXTROSE MONOHYDRATE 25 G/50ML
25-50 INJECTION, SOLUTION INTRAVENOUS
Status: DISCONTINUED | OUTPATIENT
Start: 2022-12-04 | End: 2022-12-04

## 2022-12-04 RX ORDER — MISOPROSTOL 100 UG/1
25 TABLET ORAL
Status: DISCONTINUED | OUTPATIENT
Start: 2022-12-04 | End: 2022-12-04

## 2022-12-04 RX ORDER — METHYLERGONOVINE MALEATE 0.2 MG/ML
200 INJECTION INTRAVENOUS
Status: DISCONTINUED | OUTPATIENT
Start: 2022-12-04 | End: 2022-12-04 | Stop reason: HOSPADM

## 2022-12-04 RX ORDER — OXYTOCIN 10 [USP'U]/ML
10 INJECTION, SOLUTION INTRAMUSCULAR; INTRAVENOUS
Status: DISCONTINUED | OUTPATIENT
Start: 2022-12-04 | End: 2022-12-07 | Stop reason: HOSPADM

## 2022-12-04 RX ORDER — MODIFIED LANOLIN
OINTMENT (GRAM) TOPICAL
Status: DISCONTINUED | OUTPATIENT
Start: 2022-12-04 | End: 2022-12-07 | Stop reason: HOSPADM

## 2022-12-04 RX ORDER — OXYTOCIN 10 [USP'U]/ML
10 INJECTION, SOLUTION INTRAMUSCULAR; INTRAVENOUS
Status: DISCONTINUED | OUTPATIENT
Start: 2022-12-04 | End: 2022-12-04 | Stop reason: HOSPADM

## 2022-12-04 RX ORDER — PROCHLORPERAZINE MALEATE 10 MG
10 TABLET ORAL EVERY 6 HOURS PRN
Status: DISCONTINUED | OUTPATIENT
Start: 2022-12-04 | End: 2022-12-04 | Stop reason: HOSPADM

## 2022-12-04 RX ORDER — NALOXONE HYDROCHLORIDE 0.4 MG/ML
0.2 INJECTION, SOLUTION INTRAMUSCULAR; INTRAVENOUS; SUBCUTANEOUS
Status: DISCONTINUED | OUTPATIENT
Start: 2022-12-04 | End: 2022-12-04 | Stop reason: HOSPADM

## 2022-12-04 RX ORDER — OXYTOCIN/0.9 % SODIUM CHLORIDE 30/500 ML
340 PLASTIC BAG, INJECTION (ML) INTRAVENOUS CONTINUOUS PRN
Status: DISCONTINUED | OUTPATIENT
Start: 2022-12-04 | End: 2022-12-07 | Stop reason: HOSPADM

## 2022-12-04 RX ORDER — ALBUTEROL SULFATE 90 UG/1
2 AEROSOL, METERED RESPIRATORY (INHALATION) EVERY 6 HOURS PRN
Status: DISCONTINUED | OUTPATIENT
Start: 2022-12-04 | End: 2022-12-07 | Stop reason: HOSPADM

## 2022-12-04 RX ORDER — NICOTINE POLACRILEX 4 MG
15-30 LOZENGE BUCCAL
Status: DISCONTINUED | OUTPATIENT
Start: 2022-12-04 | End: 2022-12-07 | Stop reason: HOSPADM

## 2022-12-04 RX ORDER — INSULIN LISPRO 100 [IU]/ML
3 INJECTION, SOLUTION INTRAVENOUS; SUBCUTANEOUS
Status: DISCONTINUED | OUTPATIENT
Start: 2022-12-04 | End: 2022-12-04 | Stop reason: CLARIF

## 2022-12-04 RX ORDER — MISOPROSTOL 200 UG/1
800 TABLET ORAL
Status: DISCONTINUED | OUTPATIENT
Start: 2022-12-04 | End: 2022-12-07 | Stop reason: HOSPADM

## 2022-12-04 RX ORDER — DOCUSATE SODIUM 100 MG/1
100 CAPSULE, LIQUID FILLED ORAL DAILY
Status: DISCONTINUED | OUTPATIENT
Start: 2022-12-05 | End: 2022-12-07 | Stop reason: HOSPADM

## 2022-12-04 RX ORDER — LIDOCAINE HYDROCHLORIDE 10 MG/ML
INJECTION, SOLUTION EPIDURAL; INFILTRATION; INTRACAUDAL; PERINEURAL
Status: DISPENSED
Start: 2022-12-04 | End: 2022-12-05

## 2022-12-04 RX ORDER — FENTANYL/ROPIVACAINE/NS/PF 2MCG/ML-.1
PLASTIC BAG, INJECTION (ML) EPIDURAL
Status: DISCONTINUED | OUTPATIENT
Start: 2022-12-04 | End: 2022-12-04 | Stop reason: HOSPADM

## 2022-12-04 RX ORDER — SODIUM CHLORIDE, SODIUM LACTATE, POTASSIUM CHLORIDE, CALCIUM CHLORIDE 600; 310; 30; 20 MG/100ML; MG/100ML; MG/100ML; MG/100ML
INJECTION, SOLUTION INTRAVENOUS CONTINUOUS
Status: DISCONTINUED | OUTPATIENT
Start: 2022-12-04 | End: 2022-12-04 | Stop reason: HOSPADM

## 2022-12-04 RX ORDER — METHYLERGONOVINE MALEATE 0.2 MG/ML
200 INJECTION INTRAVENOUS
Status: DISCONTINUED | OUTPATIENT
Start: 2022-12-04 | End: 2022-12-07 | Stop reason: HOSPADM

## 2022-12-04 RX ORDER — ACETAMINOPHEN 325 MG/1
975 TABLET ORAL EVERY 6 HOURS PRN
Status: DISCONTINUED | OUTPATIENT
Start: 2022-12-04 | End: 2022-12-07 | Stop reason: HOSPADM

## 2022-12-04 RX ORDER — ACETAMINOPHEN 325 MG/1
650 TABLET ORAL EVERY 4 HOURS PRN
Status: DISCONTINUED | OUTPATIENT
Start: 2022-12-04 | End: 2022-12-07 | Stop reason: HOSPADM

## 2022-12-04 RX ORDER — METOCLOPRAMIDE 10 MG/1
10 TABLET ORAL EVERY 6 HOURS PRN
Status: DISCONTINUED | OUTPATIENT
Start: 2022-12-04 | End: 2022-12-04 | Stop reason: HOSPADM

## 2022-12-04 RX ORDER — ONDANSETRON 4 MG/1
4 TABLET, ORALLY DISINTEGRATING ORAL EVERY 6 HOURS PRN
Status: DISCONTINUED | OUTPATIENT
Start: 2022-12-04 | End: 2022-12-04 | Stop reason: HOSPADM

## 2022-12-04 RX ORDER — IBUPROFEN 800 MG/1
800 TABLET, FILM COATED ORAL EVERY 6 HOURS PRN
Status: DISCONTINUED | OUTPATIENT
Start: 2022-12-04 | End: 2022-12-07 | Stop reason: HOSPADM

## 2022-12-04 RX ORDER — MISOPROSTOL 200 UG/1
400 TABLET ORAL
Status: DISCONTINUED | OUTPATIENT
Start: 2022-12-04 | End: 2022-12-07 | Stop reason: HOSPADM

## 2022-12-04 RX ORDER — METOCLOPRAMIDE HYDROCHLORIDE 5 MG/ML
10 INJECTION INTRAMUSCULAR; INTRAVENOUS EVERY 6 HOURS PRN
Status: DISCONTINUED | OUTPATIENT
Start: 2022-12-04 | End: 2022-12-04 | Stop reason: HOSPADM

## 2022-12-04 RX ORDER — IBUPROFEN 800 MG/1
800 TABLET, FILM COATED ORAL
Status: DISCONTINUED | OUTPATIENT
Start: 2022-12-04 | End: 2022-12-07 | Stop reason: HOSPADM

## 2022-12-04 RX ORDER — LIDOCAINE 40 MG/G
CREAM TOPICAL
Status: DISCONTINUED | OUTPATIENT
Start: 2022-12-04 | End: 2022-12-04 | Stop reason: HOSPADM

## 2022-12-04 RX ADMIN — MISOPROSTOL 25 MCG: 100 TABLET ORAL at 10:32

## 2022-12-04 RX ADMIN — MISOPROSTOL 25 MCG: 100 TABLET ORAL at 08:02

## 2022-12-04 RX ADMIN — KETOROLAC TROMETHAMINE 30 MG: 30 INJECTION, SOLUTION INTRAMUSCULAR at 20:51

## 2022-12-04 RX ADMIN — Medication: at 23:24

## 2022-12-04 RX ADMIN — Medication 340 ML/HR: at 20:29

## 2022-12-04 RX ADMIN — MISOPROSTOL 25 MCG: 100 TABLET ORAL at 12:33

## 2022-12-04 RX ADMIN — SODIUM CHLORIDE, POTASSIUM CHLORIDE, SODIUM LACTATE AND CALCIUM CHLORIDE 1000 ML: 600; 310; 30; 20 INJECTION, SOLUTION INTRAVENOUS at 19:39

## 2022-12-04 RX ADMIN — ACETAMINOPHEN 650 MG: 325 TABLET, FILM COATED ORAL at 23:28

## 2022-12-04 RX ADMIN — LIDOCAINE HYDROCHLORIDE 20 ML: 10 INJECTION, SOLUTION EPIDURAL; INFILTRATION; INTRACAUDAL; PERINEURAL at 20:30

## 2022-12-04 RX ADMIN — ACETAMINOPHEN 975 MG: 325 TABLET, FILM COATED ORAL at 10:32

## 2022-12-04 RX ADMIN — MISOPROSTOL 25 MCG: 100 TABLET ORAL at 05:37

## 2022-12-04 ASSESSMENT — ACTIVITIES OF DAILY LIVING (ADL)
ADLS_ACUITY_SCORE: 18
CHANGE_IN_FUNCTIONAL_STATUS_SINCE_ONSET_OF_CURRENT_ILLNESS/INJURY: NO
ADLS_ACUITY_SCORE: 18
CONCENTRATING,_REMEMBERING_OR_MAKING_DECISIONS_DIFFICULTY: NO
ADLS_ACUITY_SCORE: 18
DRESSING/BATHING_DIFFICULTY: NO
ADLS_ACUITY_SCORE: 18
DIFFICULTY_EATING/SWALLOWING: NO
WALKING_OR_CLIMBING_STAIRS_DIFFICULTY: NO
TOILETING_ISSUES: NO
FALL_HISTORY_WITHIN_LAST_SIX_MONTHS: NO
ADLS_ACUITY_SCORE: 18
ADLS_ACUITY_SCORE: 18
WEAR_GLASSES_OR_BLIND: NO
ADLS_ACUITY_SCORE: 18
DOING_ERRANDS_INDEPENDENTLY_DIFFICULTY: NO

## 2022-12-04 NOTE — PROGRESS NOTES
"Patient educated on need for continuous monitoring while in labor. Patient states she would just like to sleep. Patient also states \"they didn't monitor me the whole time last time\". Education reinforced. Patient agreeable to monitoring at this time. EFM and toco applied. Frequent adjustments made but difficulty tracing due to patient preferred position and maternal habitus. Will try to use [facility] [place] when available.   "

## 2022-12-04 NOTE — PROGRESS NOTES
"Patient declines IV insertion a this time. Education provided. Patient states she will allow IV access when \"in real labor\".   "

## 2022-12-04 NOTE — H&P
"[facility] LABOR & DELIVERY   HISTORY AND PHYSICAL UPDATE ADMISSION EXAM      NAME:Simi Hernandez  : 1995   MRN: [id number]   GESTATIONAL AGE: 37w0d    ADMISSION DATE: 2022     PCP:  Yudi Lira       Pregnancy History: This is a 28yo  @ 37w0d who presents with SROM around 11pm yesterday. Patient reports fluid was and continues to be clear.  She was 1cm in the clinic last week and 2cm here upon arrival.   She reports laboring quickly last pregnancy going from 1cm to delivered in 10 hours    OBSTETRIC HISTORY:    OB History    Para Term  AB Living   2 1 1 0 0 1   SAB IAB Ectopic Multiple Live Births   0 0 0 0 1      # Outcome Date GA Lbr John/2nd Weight Sex Delivery Anes PTL Lv   2 Current            1 Term 10/22/21 38w1d 05:10 / 00:27 2.83 kg (6 lb 3.8 oz) M Vag-Spont None N GREGORIO      Name: TETE,MALE-SIMI      Apgar1: 8  Apgar5: 9       EDC: Estimated Date of Delivery: Dec 25, 2022    EGA: 37w0d      Prenatal Complications   Patient Active Problem List   Diagnosis     Supervision of other normal pregnancy       Exam:      /69   Temp 98.4  F (36.9  C) (Oral)   Resp 18   Ht 1.549 m (5' 1\")   Wt 108.9 kg (240 lb)   SpO2 97%   BMI 45.35 kg/m      Fetal heart Rate Tracing: reactive and reassuring  Contractions: rare    HEENT  negative  Heart       Lungs      Abdomen   Abdomen soft, non-tender. BS normal. No masses, organomegaly  Extremities  Normal, Warm, No cyanosis, no clubbing, No edema and nontender    Vaginal exam: deferred    Assessment: 28yo  @ 37w0d - SROM  Asthma  GDM-A2     Plan: Admit - see IP orders  cervical ripening with misoprostol  Anticipate     Prenatal record reviewed.    Jaz Kelly DO on 2022 at 4:59 AM      "

## 2022-12-04 NOTE — PROGRESS NOTES
"Patient is resting comfortably.     /72 (BP Location: Left arm, Patient Position: Sitting, Cuff Size: Adult Regular)   Temp 98.6  F (37  C) (Oral)   Resp 18   Ht 1.549 m (5' 1\")   Wt 108.9 kg (240 lb)   SpO2 97%   BMI 45.35 kg/m      FHT: 130/mod sisi/+accel/-decel  Boyds: q5-8 min    Accuchecks: F 106    A/P: 26yo  at 37+0  PROM  - Cytotec for cervical ripening. Has received 2 doses     Asthma  - Avoid hemabate  - albuterol PRN     GDMA2  - Accuchecks per protocol   - Levemir 8U at bedtime  - Humalog 3U at dinner   "

## 2022-12-04 NOTE — PLAN OF CARE
Problem: Labor  Goal: Hemostasis  Outcome: Progressing  VSS     Problem: Labor  Goal: Stable Fetal Wellbeing  Outcome: Progressing     Problem: Labor  Goal: Effective Progression to Delivery  Outcome: Progressing     Problem: Labor  Goal: Absence of Infection Signs and Symptoms  Outcome: Progressing  afebrile     Problem: Labor  Goal: Acceptable Pain Control  Outcome: Progressing  Pain managed with position changes and heat application     Problem: Labor  Goal: Normal Uterine Contraction Pattern  Outcome: Progressing

## 2022-12-04 NOTE — PROGRESS NOTES
Orders placed. Provider at bedside to discuss POC with patient. Discussed with patient again the need for IV access d/t need for insulin drip in active labor. Education provided regarding reasoning for drip. Patient verbalizes understanding but sill would like to wait for active labor for IV placement; provider aware.

## 2022-12-04 NOTE — PROGRESS NOTES
arrives accompanied by  Kendall . Patient reports SROM 12/3/22 2300; moderate amount of clear odorless fluid. Mild irregular contractions since SROM. Gestational diabetes insulin controlled. Patient reports no other pregnancy complications. GBS-. Patient has birth plan on phone and request free movement and no IV at this time. Patient requesting to labor in bath tub. Dr. Kelly notified of arrival and will come to bedside to assess patient and place orders. Patient and  deny any concerns at this time admission completed.

## 2022-12-05 LAB — GLUCOSE BLDC GLUCOMTR-MCNC: 95 MG/DL (ref 70–99)

## 2022-12-05 PROCEDURE — 250N000013 HC RX MED GY IP 250 OP 250 PS 637: Performed by: OBSTETRICS & GYNECOLOGY

## 2022-12-05 PROCEDURE — 120N000001 HC R&B MED SURG/OB

## 2022-12-05 RX ADMIN — IBUPROFEN 800 MG: 800 TABLET ORAL at 19:16

## 2022-12-05 RX ADMIN — IBUPROFEN 800 MG: 800 TABLET ORAL at 06:29

## 2022-12-05 RX ADMIN — ACETAMINOPHEN 975 MG: 325 TABLET, FILM COATED ORAL at 06:30

## 2022-12-05 RX ADMIN — DOCUSATE SODIUM 100 MG: 100 CAPSULE, LIQUID FILLED ORAL at 08:50

## 2022-12-05 RX ADMIN — IBUPROFEN 800 MG: 800 TABLET ORAL at 12:53

## 2022-12-05 RX ADMIN — ACETAMINOPHEN 650 MG: 325 TABLET, FILM COATED ORAL at 17:33

## 2022-12-05 RX ADMIN — ACETAMINOPHEN 650 MG: 325 TABLET, FILM COATED ORAL at 12:52

## 2022-12-05 ASSESSMENT — ACTIVITIES OF DAILY LIVING (ADL)
ADLS_ACUITY_SCORE: 18

## 2022-12-05 NOTE — ANESTHESIA PREPROCEDURE EVALUATION
Anesthesia Pre-Procedure Evaluation    Patient: Yue Hernandez   MRN: [id number] : 1995        Procedure : * No procedures listed *          Past Medical History:   Diagnosis Date     Uncomplicated asthma       No past surgical history on file.   Allergies   Allergen Reactions     Penicillin G Hives      Social History     Tobacco Use     Smoking status: Never     Smokeless tobacco: Never   Substance Use Topics     Alcohol use: Not Currently      Wt Readings from Last 1 Encounters:   22 108.9 kg (240 lb)        Anesthesia Evaluation            ROS/MED HX  ENT/Pulmonary:     (+) asthma     Neurologic:       Cardiovascular:       METS/Exercise Tolerance:     Hematologic:       Musculoskeletal:       GI/Hepatic:       Renal/Genitourinary:       Endo:     (+) Obesity, gestational diabetes and Insulin,    Psychiatric/Substance Use:       Infectious Disease:       Malignancy:       Other:      (+) Possibly pregnant, ,            OUTSIDE LABS:  CBC:   Lab Results   Component Value Date    HGB 11.4 (L) 2022    HGB 11.7 09/15/2022     BMP:   Lab Results   Component Value Date     (H) 2022     (H) 2022     COAGS: No results found for: PTT, INR, FIBR  POC: No results found for: BGM, HCG, HCGS  HEPATIC: No results found for: ALBUMIN, PROTTOTAL, ALT, AST, GGT, ALKPHOS, BILITOTAL, BILIDIRECT, JOSE  OTHER:   Lab Results   Component Value Date    A1C 6.6 (H) 2022       Anesthesia Plan    ASA Status:  3      Anesthesia Type: Epidural.              Consents    Anesthesia Plan(s) and associated risks, benefits, and realistic alternatives discussed. Questions answered and patient/representative(s) expressed understanding.    - Discussed:     - Discussed with:  Patient         Postoperative Care            Comments:    Other Comments: When I arrived to discuss and place an epidural catheter the patient stated she was ready to push. No procedure performed.             Ajith Hill,  MD

## 2022-12-05 NOTE — LACTATION NOTE
This note was copied from a baby's chart.  This writer met with Yue per lactation order x 2.  Infant is 37-0/7 weeks gestation at birth.  She reports wanting to breastfeed her first child, who was born at [facility] at 38-1/7 weeks gestation.  Her first child had blood sugar issues and went to the [facility] and needed formula supplements and struggled to latch.  Yue became frustrated and ended up pumping and bottle feeding EBM x 3 months.  This morning, she states she wants to breastfeed this child and states this infant is latching better, however, she notes that infant is becoming more sleepy.  This writer gave Yue a copy of the care plan below and discuss with her that a 37-0/7 week gestation infant has thinner fat pads and lower energy than a 41 week gestation 9# baby.  Infant may do well for the first 18-24 hours, then early term infants usually become sleepy.  Instructed to hand express breast to get colostrum flowing, then offer the breast, using breast compression to assist with milk transfer.  Listen for swallows and watch for rhythmic, active sucking.  When infant falls asleep at the breast, end feeding at the breast and offer infant expressed colostrum.  If no colostrum, then offer donor milk or formula.      Yue was able to hand express 5 ml colostrum earlier today, however, infant has become more and more sleepy.  This afternoon at 1520, this writer observed infant at breast.  Infant positoined in football hold.  Infant able to open his mouth and latch, however, infant needed frequent stimulation to keep actively sucking.  Yue states she heard one or two swallows.  This writer encouxraged Yue to offer more food (calories).  She agreed to donor milk.  Infant able to suck for approximately 5 minutes at one breast, then this writer bottle fed infant donor milk.  Paced bottle feeding demonstrated.  Infant needed to take breaks and only transferred 5 ml, had a small spit up and refused to take more.   Yue pumped with 24 mm flanges on the Initiate program.  Encouraged to try the 27 mm flange next pumping session.      Breastfeeding plan:  Feed baby at least 8 times in 24 hours.  Wake infant at least every 3 hours.  Hand express to get colostrum flowing, breastfeed and listen for swallows.  End feeding at breast when infant no longer actively sucking.  Offer infant expressed colostrum/donor milk and Yue to pump after every breastfeeding until milk supply established.  She verbalizes understanding of all education given.  She denies any further questions.  Lactation to follow up tomorrow.  Yue verbalizes understanding of all education given.  She denies any further questions.        Care Plan Breastfeeding Early Term/Low Birth Weight Baby     May struggle to sustain a latch due to thinner fat pads in cheeks    May have decreased energy to stay at breast long enough to get enough milk    Decreased milk transfer over time will result in infant weight loss and low milk supply    Feeding Plan    Hand express, as needed    Breastfeed 8-12+ times in 24 hours    Watch for:   o Rhythmic sucking and swallowing during feeding  o Content baby after feeding  o Adequate wet & dirty diapers (per feeding log)      Supplement If infant does not latch or is sleepy at breast, end breastfeeding and feed expressed milk using the amounts below as a guideline; give more as baby cues. If necessary, make up the difference with donor milk or formula as a bridge until milk supply increases:    o 0-24 hours 2-10 ml each feeding  o 24-48 hours 5-15 ml each feeding  o 48-72 hours 15-30 ml each feeding  o 72-96 hours 30-60 ml each feeding      Pump after feedings to stimulate milk production until milk supply well established & baby breastfeeding with rhythmic sucking and swallowing (10-20 minutes), adequate output, and weight gain.    Contact Outpatient Lactation Clinic after discharge as needed.  [phone]                  12/2021

## 2022-12-05 NOTE — PROVIDER NOTIFICATION
"Notified Dr. Page Barajas that the pt stated she thought she was done with blood sugar checks since she had her AM fasting blood sugar that was 95. The order stated to do AC HS blood sugar checks for 24 hours.     Response: \"Ok to discontinue that order. No more blood sugar checks necessary.\"  "

## 2022-12-05 NOTE — PROVIDER NOTIFICATION
Dr. Gonzalez updated on Pt's labor status including continued difficulty with FHR monitoring with INDRA. Also, pt is reporting to have stronger, more frequent and painful ctx. IV placed by PICC RN. Dr. Gonzalez will come see pt shortly.

## 2022-12-05 NOTE — PROGRESS NOTES
"OB Post Partum Note    ASSESSMENT: PLAN:     PPD#1 spontaneous vaginal delivery  Doing well  Routine cares  Anticipate discharge to home in am    SUBJECTIVE:   no complaints, denies fever, chills.  Tolerating po, ambulating.  Baby doing well    OBJECTIVE:    /51 (BP Location: Right arm, Patient Position: Semi-Resendez's, Cuff Size: Adult Large)   Pulse 90   Temp 97.6  F (36.4  C) (Oral)   Resp 18   Ht 1.549 m (5' 1\")   Wt 108.9 kg (240 lb)   SpO2 98%   Breastfeeding Unknown   BMI 45.35 kg/m        abd- fundus firm  Ext- no tenderness,   cv- regular rate  Lungs- clear    Ivett Dixon MD  [facility]  [phone]    "

## 2022-12-05 NOTE — PLAN OF CARE
Problem: Postpartum (Vaginal Delivery)  Goal: Successful Maternal Role Transition  Outcome: Progressing  Bonding well with infant.  at bedside and supportive  Goal: Hemostasis  Outcome: Progressing  VSS  Goal: Absence of Infection Signs and Symptoms  Outcome: Progressing  Afebrile  Goal: Optimal Pain Control and Function  Outcome: Progressing  Pain well controlled PP with IV toradol and PO tylenol. Also utilizing witch hazel pads, dermaplast and ice.   Goal: Effective Urinary Elimination  Outcome: Progressing  Able to void without difficulty post delivery.

## 2022-12-05 NOTE — L&D DELIVERY NOTE
VAGINAL DELIVERY NOTE    PRE DELIVERY DIAGNOSIS  1) 28yo  at 37w0d Gestational age  2) PROM   3) Labial tissue band     POST DELIVERY DIAGNOSIS  1) 28yo   2) Delivery of a 6lb5oz male living infant.  3) Apgars of 8 at one minute, 9 at 5 minutes  4) Repaired labial tissue band     PROBLEM LIST:  Patient Active Problem List   Diagnosis     Supervision of other normal pregnancy     Pregnancy       Delivery Method/Type:       PROVIDER:   Asia Gonzalez MD     EPISIOTOMY or INCISION:  None    INDICATION FOR INSTRUMENTAL DELIVERY   N/A    PLACENTA AND CORD:  spontaneous, intact,  with a 3 vessel cord.      QUANTITATIVE BLOOD LOSS:  Delivery QBL (mL): 400mL    COMPLICATIONS:  None    DESCRIPTION OF PROCEDURE  Yue Hernandez is a 27 year old  who was admitted with prelabor rupture of membranes.  She received 4 doses of misoprostol and then continued to labor spontaneously with SROM. She progressed to completely dilated and started pushing.  On exam she was noted to have a 1cm band of tissue between the labia that obstructed the vaginal opening.  There was a quarter sized hold below this band.  The tissue was injected with 10mL 1% lidocaine but the patient pushed uncontrollably and the band tore during delivery.  The baby's head was delivered JEYSON. Shoulder and body delivered without difficulty.  There was a spontaneous cry. No gross fetal defects were observed. Delayed cord clamping >60sec.  The cord was clamped x 2 and cut. Intact placenta with 3 vessel cord delivered intact, normal in appearance. A second degree laceration was repaired with deep sutures of 3-0 vicryl.  Interrupted sutured of 4-0 vicryl were then placed coursing along the lower labia and the posterior forchette, suturing vaginal tissue to perineal skin. Excellent hemostasis was noted.     The vaginal tissue of the prior labial band was oversewn with 4-0 vicryl with figure of eight sutures to avoid adhesion formation.     The baby  stayed in the room with mother. The mother remained in labor and delivery. Sponge and sharp count is correct.    Asia Gonzalez MD       CC1: Yudi Lira MD

## 2022-12-06 PROBLEM — O24.419 GESTATIONAL DIABETES: Status: ACTIVE | Noted: 2021-08-05

## 2022-12-06 PROBLEM — Z34.90 PREGNANCY: Status: RESOLVED | Noted: 2022-12-04 | Resolved: 2022-12-06

## 2022-12-06 PROCEDURE — 120N000001 HC R&B MED SURG/OB

## 2022-12-06 PROCEDURE — 250N000013 HC RX MED GY IP 250 OP 250 PS 637: Performed by: OBSTETRICS & GYNECOLOGY

## 2022-12-06 RX ORDER — IBUPROFEN 800 MG/1
800 TABLET, FILM COATED ORAL EVERY 6 HOURS PRN
Qty: 40 TABLET | Refills: 0 | Status: SHIPPED | OUTPATIENT
Start: 2022-12-06

## 2022-12-06 RX ADMIN — Medication: at 15:08

## 2022-12-06 RX ADMIN — IBUPROFEN 800 MG: 800 TABLET ORAL at 11:59

## 2022-12-06 RX ADMIN — IBUPROFEN 800 MG: 800 TABLET ORAL at 02:31

## 2022-12-06 RX ADMIN — IBUPROFEN 800 MG: 800 TABLET ORAL at 18:01

## 2022-12-06 RX ADMIN — DOCUSATE SODIUM 100 MG: 100 CAPSULE, LIQUID FILLED ORAL at 09:32

## 2022-12-06 ASSESSMENT — ACTIVITIES OF DAILY LIVING (ADL)
ADLS_ACUITY_SCORE: 18

## 2022-12-06 NOTE — LACTATION NOTE
This note was copied from a baby's chart.  This writer met with Yue this morning and again this afternoon, with OT.  Infant's blood sugar was very low at 24 hours and infant needed 24 calorie formula.  Infant had been bottle feeding with slow flow nipple, however, Yue states concern, as infant has been taking small volumes and Yue is concerned about the way infant gulps the milk.      This writer assessed a tight frenulum at yesterday's visit, although this was not charted.  Infant is able to bring tongue out over the lower gum ridge, however, infant struggles with bottle feeding.      Yue concerned, as she is only able to express 2 ml.  Reassurance given.  Instructed to use the 27 mm flange.  Educated milk does not come in until 50-60 hours post partum, over even later, with some mothers.      OT consult this afternoon feeding.  This writer observed OT educating mother on how to bottle feed in side lying position.  Three different nipples used and Dr. Walter canela nipple was the bottle and nipple that infant was able to transfer milk the best.  Infant able to drink donor milk now, per MD order.  Pacing is needed after every 2 swallows.  Yue very engaged during the visit and states she feels better after the OT visit.      Yue to continue to offer breast for a short time, then offer bottle of EBM/ donor milk and Yue to pump until milk supply established and infant is able to transfer well at the breast.  Strongly encouraged to follow up at outpatient lactation clinic after discharge.  Yue verbalizes understanding of all education given.  She denies any further questions.

## 2022-12-06 NOTE — PLAN OF CARE
Problem: Postpartum (Vaginal Delivery)  Goal: Optimal Pain Control and Function  Outcome: Progressing    VSS. Fundus firm. C/o 3/10 pain and reports tolerable pain relief with ibuprofen and tylenol use. Denied pain medications at 2130 and the rest of the shift when offered. Ambulating independently in the room. Breastfeeding and supplementing with 5-15mL of pumped maternal breast milk and donor milk. Bonding well with baby.

## 2022-12-06 NOTE — DISCHARGE SUMMARY
[facility] Discharge Summary    Yue Hernandez MRN# [id number]   Age: 27 year old YOB: 1995     Date of Admission:  12/4/2022  Date of Discharge::  12/6/2022  Admitting Physician:  Jaz Kelly DO  Discharge Physician:  Ivett Dixon MD     Home clinic: [facility]            Admission Diagnoses:   Pregnancy [Z34.90]   spontaneous rupture of membranes  Gestational diabetes           Discharge Diagnosis:   Normal spontaneous vaginal delivery  Intrauterine pregnancy at 37 weeks gestation          Procedures:   Procedure(s): spontaneous vaginal delivery                    Medications Prior to Admission:     Medications Prior to Admission   Medication Sig Dispense Refill Last Dose     acetaminophen (TYLENOL) 325 MG tablet Take 2 tablets (650 mg) by mouth every 4 hours as needed for mild pain or fever (greater than or equal to 38  C /100.4  F (oral) or 38.5  C/ 101.4  F (core).)   Past Week     Prenatal MV & Min w/FA-DHA (PRENATAL ADULT GUMMY/DHA/FA PO) Take 2 each by mouth every 24 hours   12/4/2022     ACCU-CHEK GUIDE test strip Use to test blood sugar four times daily. 200 strip 2      ACCU-CHEK GUIDE test strip Use to test blood sugar 4 daily. 100 strip 6      acetone urine (KETOSTIX) test strip Test once daily. 50 strip 2      acetone urine (KETOSTIX) test strip Use 1 strip per day to check urine for ketones. 50 strip 3      Alcohol Swabs PADS 1 each 2 times daily 100 each 1      blood glucose (ACCU-CHEK GUIDE) test strip Use to test blood sugar 4 times day. Accu-Chek Guide test strips or dispense per insurance at pharmacy discretion. 125 strip 3      blood glucose monitoring (SOFTCLIX) lancets Use to test blood sugar four times daily. 100 each 2      blood glucose monitoring (SOFTCLIX) lancets Use to test blood sugar 4 times daily. 100 each 6      blood glucose monitoring (SOFTCLIX) lancets Use to test blood sugar 4 times daily. Softclix lancets or dispense brand per  insurance at pharmacy discretion. 100 each 4      Blood Glucose Monitoring Suppl (ACCU-CHEK GUIDE ME) w/Device KIT 1 each as needed 1 kit 0      Blood Glucose Monitoring Suppl (ACCU-CHEK GUIDE ME) w/Device KIT 1 each 4 times daily Accu-Chek Guide Me meter or dispense per insurance at pharmacy discretion. 1 kit 1      docusate sodium (COLACE) 100 MG capsule Take 1 capsule (100 mg) by mouth daily        Insulin Pen Needle (PEN NEEDLES) 32G X 4 MM MISC 1 each 2 times daily 90 each 1      [DISCONTINUED] ibuprofen (ADVIL/MOTRIN) 800 MG tablet Take 1 tablet (800 mg) by mouth every 6 hours as needed for other (cramping)   More than a month     [DISCONTINUED] insulin detemir (LEVEMIR FLEXTOUCH) 100 UNIT/ML pen Inject 8 Units Subcutaneous At Bedtime You may increase 2 units every 2 days until fasting blood sugar is between 85-94 mg/dl. 15 mL 1 12/3/2022     [DISCONTINUED] insulin lispro (HUMALOG KWIKPEN) 100 UNIT/ML (1 unit dial) KWIKPEN Inject 3 Units Subcutaneous daily (before supper) 15 mL 1 12/3/2022             Discharge Medications:     Current Discharge Medication List      CONTINUE these medications which have CHANGED    Details   ibuprofen (ADVIL/MOTRIN) 800 MG tablet Take 1 tablet (800 mg) by mouth every 6 hours as needed for other (cramping)  Qty: 40 tablet, Refills: 0    Associated Diagnoses: Single liveborn infant delivered vaginally         CONTINUE these medications which have NOT CHANGED    Details   acetaminophen (TYLENOL) 325 MG tablet Take 2 tablets (650 mg) by mouth every 4 hours as needed for mild pain or fever (greater than or equal to 38  C /100.4  F (oral) or 38.5  C/ 101.4  F (core).)    Associated Diagnoses: Single liveborn infant delivered vaginally      Prenatal MV & Min w/FA-DHA (PRENATAL ADULT GUMMY/DHA/FA PO) Take 2 each by mouth every 24 hours      !! ACCU-CHEK GUIDE test strip Use to test blood sugar four times daily.  Qty: 200 strip, Refills: 2    Associated Diagnoses: GDM (gestational  diabetes mellitus)      !! ACCU-CHEK GUIDE test strip Use to test blood sugar 4 daily.  Qty: 100 strip, Refills: 6    Comments: Please dispense Accu Chek guide + test strips + lancets or any other meter + testing supplies per patient's insurance formulary  Associated Diagnoses: GDM (gestational diabetes mellitus)      !! acetone urine (KETOSTIX) test strip Test once daily.  Qty: 50 strip, Refills: 2    Associated Diagnoses: GDM (gestational diabetes mellitus)      !! acetone urine (KETOSTIX) test strip Use 1 strip per day to check urine for ketones.  Qty: 50 strip, Refills: 3    Associated Diagnoses: Gestational diabetes      Alcohol Swabs PADS 1 each 2 times daily  Qty: 100 each, Refills: 1    Associated Diagnoses: Gestational diabetes mellitus (GDM), antepartum, gestational diabetes method of control unspecified      !! blood glucose (ACCU-CHEK GUIDE) test strip Use to test blood sugar 4 times day. Accu-Chek Guide test strips or dispense per insurance at pharmacy discretion.  Qty: 125 strip, Refills: 3    Associated Diagnoses: Gestational diabetes      !! blood glucose monitoring (SOFTCLIX) lancets Use to test blood sugar four times daily.  Qty: 100 each, Refills: 2    Associated Diagnoses: GDM (gestational diabetes mellitus)      !! blood glucose monitoring (SOFTCLIX) lancets Use to test blood sugar 4 times daily.  Qty: 100 each, Refills: 6    Comments: Please dispense Accu Chek guide + test strips + lancets or any other meter + testing supplies per patient's insurance formulary  Associated Diagnoses: GDM (gestational diabetes mellitus)      !! blood glucose monitoring (SOFTCLIX) lancets Use to test blood sugar 4 times daily. Softclix lancets or dispense brand per insurance at pharmacy discretion.  Qty: 100 each, Refills: 4    Associated Diagnoses: Gestational diabetes      !! Blood Glucose Monitoring Suppl (ACCU-CHEK GUIDE ME) w/Device KIT 1 each as needed  Qty: 1 kit, Refills: 0    Comments: Please dispense  Accu Chek guide + test strips + lancets or any other meter + testing supplies per patient's insurance formulary  Associated Diagnoses: GDM (gestational diabetes mellitus)      !! Blood Glucose Monitoring Suppl (ACCU-CHEK GUIDE ME) w/Device KIT 1 each 4 times daily Accu-Chek Guide Me meter or dispense per insurance at pharmacy discretion.  Qty: 1 kit, Refills: 1    Associated Diagnoses: Gestational diabetes      docusate sodium (COLACE) 100 MG capsule Take 1 capsule (100 mg) by mouth daily    Associated Diagnoses: Single liveborn infant delivered vaginally      Insulin Pen Needle (PEN NEEDLES) 32G X 4 MM MISC 1 each 2 times daily  Qty: 90 each, Refills: 1    Associated Diagnoses: Gestational diabetes mellitus (GDM), antepartum, gestational diabetes method of control unspecified       !! - Potential duplicate medications found. Please discuss with provider.      STOP taking these medications       insulin detemir (LEVEMIR FLEXTOUCH) 100 UNIT/ML pen Comments:   Reason for Stopping:         insulin lispro (HUMALOG KWIKPEN) 100 UNIT/ML (1 unit dial) KWIKPEN Comments:   Reason for Stopping:                     Consultations:   No consultations were requested during this admission          Brief History of Labor:   The patient was admitted to labor and delivery with spontaneous rupture of membranes. she progressed through normal labor curve to complete.  spontaneous vaginal delivery without complications                  Hospital Course:   The patient's hospital course was unremarkable.  On discharge, her pain was well controlled. Vaginal bleeding is similar to peak menstrual flow.  Voiding without difficulty.  Ambulating well and tolerating a normal diet.  No fever.  Breastfeeding well.  Infant is stable but blood sugars and weight are being monitored.  No bowel movement yet.*  She was discharged on post-partum day #2.    Post-partum hemoglobin:   Hemoglobin   Date Value Ref Range Status   11/30/2022 11.4 (L) 11.7 - 15.7  g/dL Final             Discharge Instructions and Follow-Up:   Discharge diet: Regular   Discharge activity: No sex for 6 week(s)   Discharge follow-up: Follow up with Dr. Lira in 6 weeks   Wound care:            Discharge Disposition:   Discharged to home      Attestation:  I have reviewed today's vital signs, notes, medications, labs and imaging.    Ivett Dixon MD

## 2022-12-06 NOTE — PLAN OF CARE
Problem: Postpartum (Vaginal Delivery)  Goal: Optimal Pain Control and Function  Outcome: Progressing    VSS. Fundus firm. Reports tolerable pain control with prn ibuprofen, declines prn tylenol when offered. Ambulating independently in the room. Breastfeeding and pumping Q 2-3 hours. Bonding well with baby.

## 2022-12-07 VITALS
HEART RATE: 70 BPM | BODY MASS INDEX: 45.31 KG/M2 | WEIGHT: 240 LBS | DIASTOLIC BLOOD PRESSURE: 67 MMHG | TEMPERATURE: 97.9 F | RESPIRATION RATE: 18 BRPM | SYSTOLIC BLOOD PRESSURE: 119 MMHG | HEIGHT: 61 IN | OXYGEN SATURATION: 97 %

## 2022-12-07 PROCEDURE — 250N000013 HC RX MED GY IP 250 OP 250 PS 637: Performed by: OBSTETRICS & GYNECOLOGY

## 2022-12-07 RX ADMIN — DOCUSATE SODIUM 100 MG: 100 CAPSULE, LIQUID FILLED ORAL at 08:29

## 2022-12-07 RX ADMIN — IBUPROFEN 800 MG: 800 TABLET ORAL at 00:17

## 2022-12-07 RX ADMIN — IBUPROFEN 800 MG: 800 TABLET ORAL at 08:29

## 2022-12-07 ASSESSMENT — ACTIVITIES OF DAILY LIVING (ADL)
ADLS_ACUITY_SCORE: 18

## 2022-12-07 NOTE — PLAN OF CARE
Patient vitals stable, assessments within normal limits. Patient is up ad gladis. She is voiding adequately. She is taking ibuprofen for pain, declines tylenol. Patient independent with self and baby cares.     Discharge order placed this AM. Infant on BS protocol. Patient staying for maternal-infant bonding, per patient request.

## 2022-12-07 NOTE — PLAN OF CARE
Problem: Breastfeeding  Goal: Effective Breastfeeding  Outcome: Progressing   Mom breast feeds on cue and also supplements with formula. Weight loss is WNL. Has slept intermittently through the night.

## 2022-12-07 NOTE — PLAN OF CARE
Problem: Postpartum (Vaginal Delivery)  Goal: Optimal Pain Control and Function  Outcome: Met  Intervention: Prevent or Manage Pain    VSS. Fundus firm. Ambulating independently in the room. C/o 3/10 pain, reported pain relief with ibuprofen use. Denied additional pain medications when offered the rest of the shift. Breastfeeding and supplementing with formula. Bonding well with baby. Given discharge instructions and warning signs and verbalized understanding. Pt verbalized having her postpartum follow-up appointment scheduled.

## 2023-02-11 ENCOUNTER — HEALTH MAINTENANCE LETTER (OUTPATIENT)
Age: 28
End: 2023-02-11

## 2024-03-09 ENCOUNTER — HEALTH MAINTENANCE LETTER (OUTPATIENT)
Age: 29
End: 2024-03-09

## 2025-03-16 ENCOUNTER — HEALTH MAINTENANCE LETTER (OUTPATIENT)
Age: 30
End: 2025-03-16